# Patient Record
Sex: MALE | Race: WHITE | NOT HISPANIC OR LATINO
[De-identification: names, ages, dates, MRNs, and addresses within clinical notes are randomized per-mention and may not be internally consistent; named-entity substitution may affect disease eponyms.]

---

## 2017-01-30 ENCOUNTER — RECORDS - HEALTHEAST (OUTPATIENT)
Dept: ADMINISTRATIVE | Facility: OTHER | Age: 82
End: 2017-01-30

## 2017-02-20 ENCOUNTER — COMMUNICATION - HEALTHEAST (OUTPATIENT)
Dept: INTERNAL MEDICINE | Facility: CLINIC | Age: 82
End: 2017-02-20

## 2017-03-01 ENCOUNTER — RECORDS - HEALTHEAST (OUTPATIENT)
Dept: ADMINISTRATIVE | Facility: OTHER | Age: 82
End: 2017-03-01

## 2017-03-29 ENCOUNTER — OFFICE VISIT - HEALTHEAST (OUTPATIENT)
Dept: INTERNAL MEDICINE | Facility: CLINIC | Age: 82
End: 2017-03-29

## 2017-03-29 DIAGNOSIS — R07.89 CHEST WALL PAIN: ICD-10-CM

## 2017-03-29 ASSESSMENT — MIFFLIN-ST. JEOR: SCORE: 1421.08

## 2017-05-22 ENCOUNTER — OFFICE VISIT - HEALTHEAST (OUTPATIENT)
Dept: INTERNAL MEDICINE | Facility: CLINIC | Age: 82
End: 2017-05-22

## 2017-05-22 DIAGNOSIS — R07.89 CHEST WALL PAIN: ICD-10-CM

## 2017-05-22 ASSESSMENT — MIFFLIN-ST. JEOR: SCORE: 1416.54

## 2017-06-07 ENCOUNTER — COMMUNICATION - HEALTHEAST (OUTPATIENT)
Dept: INTERNAL MEDICINE | Facility: CLINIC | Age: 82
End: 2017-06-07

## 2017-06-08 ENCOUNTER — COMMUNICATION - HEALTHEAST (OUTPATIENT)
Dept: INTERNAL MEDICINE | Facility: CLINIC | Age: 82
End: 2017-06-08

## 2017-06-26 ENCOUNTER — RECORDS - HEALTHEAST (OUTPATIENT)
Dept: ADMINISTRATIVE | Facility: OTHER | Age: 82
End: 2017-06-26

## 2017-06-29 ENCOUNTER — OFFICE VISIT - HEALTHEAST (OUTPATIENT)
Dept: INTERNAL MEDICINE | Facility: CLINIC | Age: 82
End: 2017-06-29

## 2017-06-29 DIAGNOSIS — I25.10 CORONARY ATHEROSCLEROSIS: ICD-10-CM

## 2017-06-29 DIAGNOSIS — G45.9 TIA (TRANSIENT ISCHEMIC ATTACK): ICD-10-CM

## 2017-07-03 ENCOUNTER — OFFICE VISIT - HEALTHEAST (OUTPATIENT)
Dept: CARDIOLOGY | Facility: CLINIC | Age: 82
End: 2017-07-03

## 2017-07-03 DIAGNOSIS — I10 ESSENTIAL HYPERTENSION: ICD-10-CM

## 2017-07-03 DIAGNOSIS — I67.9 CEREBRAL VASCULAR DISEASE: ICD-10-CM

## 2017-07-03 DIAGNOSIS — I25.10 CORONARY ARTERY DISEASE INVOLVING NATIVE CORONARY ARTERY OF NATIVE HEART WITHOUT ANGINA PECTORIS: ICD-10-CM

## 2017-07-03 DIAGNOSIS — E78.5 DYSLIPIDEMIA: ICD-10-CM

## 2017-07-03 ASSESSMENT — MIFFLIN-ST. JEOR: SCORE: 1357.58

## 2017-07-06 ENCOUNTER — HOSPITAL ENCOUNTER (OUTPATIENT)
Dept: CARDIOLOGY | Facility: CLINIC | Age: 82
Discharge: HOME | End: 2017-07-06
Attending: INTERNAL MEDICINE

## 2017-07-06 DIAGNOSIS — I67.9 CEREBRAL VASCULAR DISEASE: ICD-10-CM

## 2017-07-14 ENCOUNTER — COMMUNICATION - HEALTHEAST (OUTPATIENT)
Dept: CARDIOLOGY | Facility: CLINIC | Age: 82
End: 2017-07-14

## 2017-07-19 ENCOUNTER — OFFICE VISIT - HEALTHEAST (OUTPATIENT)
Dept: INTERNAL MEDICINE | Facility: CLINIC | Age: 82
End: 2017-07-19

## 2017-07-19 DIAGNOSIS — R19.7 DIARRHEA: ICD-10-CM

## 2017-07-19 ASSESSMENT — MIFFLIN-ST. JEOR: SCORE: 1325.83

## 2017-07-20 ENCOUNTER — COMMUNICATION - HEALTHEAST (OUTPATIENT)
Dept: INTERNAL MEDICINE | Facility: CLINIC | Age: 82
End: 2017-07-20

## 2017-08-03 ENCOUNTER — COMMUNICATION - HEALTHEAST (OUTPATIENT)
Dept: INTERNAL MEDICINE | Facility: CLINIC | Age: 82
End: 2017-08-03

## 2017-08-07 ENCOUNTER — OFFICE VISIT - HEALTHEAST (OUTPATIENT)
Dept: INTERNAL MEDICINE | Facility: CLINIC | Age: 82
End: 2017-08-07

## 2017-08-07 ENCOUNTER — COMMUNICATION - HEALTHEAST (OUTPATIENT)
Dept: INTERNAL MEDICINE | Facility: CLINIC | Age: 82
End: 2017-08-07

## 2017-08-07 DIAGNOSIS — R41.3 MEMORY LOSS: ICD-10-CM

## 2017-08-07 DIAGNOSIS — M54.9 BACK PAIN: ICD-10-CM

## 2017-08-07 ASSESSMENT — MIFFLIN-ST. JEOR: SCORE: 1325.83

## 2017-08-11 ENCOUNTER — COMMUNICATION - HEALTHEAST (OUTPATIENT)
Dept: NEUROLOGY | Facility: CLINIC | Age: 82
End: 2017-08-11

## 2017-08-14 ENCOUNTER — HOSPITAL ENCOUNTER (OUTPATIENT)
Dept: NEUROLOGY | Facility: CLINIC | Age: 82
Setting detail: THERAPIES SERIES
Discharge: STILL ADMITTED OR ADMINISTRATIVE | End: 2017-08-14
Attending: SOCIAL WORKER

## 2017-08-14 ENCOUNTER — HOSPITAL ENCOUNTER (OUTPATIENT)
Dept: NEUROLOGY | Facility: CLINIC | Age: 82
Setting detail: THERAPIES SERIES
Discharge: STILL ADMITTED OR ADMINISTRATIVE | End: 2017-08-14
Attending: INTERNAL MEDICINE

## 2017-08-14 DIAGNOSIS — R41.0 CONFUSION: ICD-10-CM

## 2017-08-14 DIAGNOSIS — E78.00 PURE HYPERCHOLESTEROLEMIA: ICD-10-CM

## 2017-08-14 DIAGNOSIS — I25.10 CORONARY ATHEROSCLEROSIS: ICD-10-CM

## 2017-08-15 ENCOUNTER — HOSPITAL ENCOUNTER (OUTPATIENT)
Dept: PHYSICAL MEDICINE AND REHAB | Facility: CLINIC | Age: 82
Discharge: HOME | End: 2017-08-15
Attending: INTERNAL MEDICINE

## 2017-08-15 ENCOUNTER — COMMUNICATION - HEALTHEAST (OUTPATIENT)
Dept: NEUROLOGY | Facility: CLINIC | Age: 82
End: 2017-08-15

## 2017-08-15 DIAGNOSIS — M54.6 THORACIC BACK PAIN: ICD-10-CM

## 2017-08-15 DIAGNOSIS — M54.50 LUMBALGIA: ICD-10-CM

## 2017-08-15 DIAGNOSIS — M79.18 MYOFASCIAL PAIN: ICD-10-CM

## 2017-08-15 DIAGNOSIS — G47.9 SLEEP DISTURBANCE: ICD-10-CM

## 2017-08-15 ASSESSMENT — MIFFLIN-ST. JEOR: SCORE: 1315.85

## 2017-08-21 ENCOUNTER — HOSPITAL ENCOUNTER (OUTPATIENT)
Dept: NEUROLOGY | Facility: CLINIC | Age: 82
Discharge: HOME | End: 2017-08-21
Attending: PSYCHIATRY & NEUROLOGY

## 2017-08-21 ENCOUNTER — HOSPITAL ENCOUNTER (OUTPATIENT)
Dept: OCCUPATIONAL THERAPY | Age: 82
Setting detail: THERAPIES SERIES
Discharge: STILL ADMITTED OR ADMINISTRATIVE | End: 2017-08-21
Attending: OCCUPATIONAL THERAPIST

## 2017-08-21 ENCOUNTER — COMMUNICATION - HEALTHEAST (OUTPATIENT)
Dept: PHYSICAL MEDICINE AND REHAB | Facility: CLINIC | Age: 82
End: 2017-08-21

## 2017-08-21 DIAGNOSIS — R41.0 CONFUSION: ICD-10-CM

## 2017-08-21 DIAGNOSIS — R41.89 COGNITIVE IMPAIRMENT: ICD-10-CM

## 2017-08-29 ENCOUNTER — HOSPITAL ENCOUNTER (OUTPATIENT)
Dept: MRI IMAGING | Facility: CLINIC | Age: 82
Discharge: HOME | End: 2017-08-29
Attending: PHYSICIAN ASSISTANT

## 2017-08-29 ENCOUNTER — HOSPITAL ENCOUNTER (OUTPATIENT)
Dept: MRI IMAGING | Facility: CLINIC | Age: 82
Discharge: HOME | End: 2017-08-29
Attending: PSYCHIATRY & NEUROLOGY

## 2017-08-29 DIAGNOSIS — M54.50 LUMBALGIA: ICD-10-CM

## 2017-08-29 DIAGNOSIS — R41.0 CONFUSION: ICD-10-CM

## 2017-08-29 DIAGNOSIS — M54.6 THORACIC BACK PAIN: ICD-10-CM

## 2017-08-29 DIAGNOSIS — M79.18 MYOFASCIAL PAIN: ICD-10-CM

## 2017-08-31 ENCOUNTER — COMMUNICATION - HEALTHEAST (OUTPATIENT)
Dept: PHYSICAL MEDICINE AND REHAB | Facility: CLINIC | Age: 82
End: 2017-08-31

## 2017-09-01 ENCOUNTER — COMMUNICATION - HEALTHEAST (OUTPATIENT)
Dept: NEUROLOGY | Facility: CLINIC | Age: 82
End: 2017-09-01

## 2017-09-01 ENCOUNTER — COMMUNICATION - HEALTHEAST (OUTPATIENT)
Dept: INTERNAL MEDICINE | Facility: CLINIC | Age: 82
End: 2017-09-01

## 2017-09-06 ENCOUNTER — COMMUNICATION - HEALTHEAST (OUTPATIENT)
Dept: INTERNAL MEDICINE | Facility: CLINIC | Age: 82
End: 2017-09-06

## 2017-09-08 ENCOUNTER — RECORDS - HEALTHEAST (OUTPATIENT)
Dept: ADMINISTRATIVE | Facility: OTHER | Age: 82
End: 2017-09-08

## 2017-09-12 ENCOUNTER — COMMUNICATION - HEALTHEAST (OUTPATIENT)
Dept: PHYSICAL MEDICINE AND REHAB | Facility: CLINIC | Age: 82
End: 2017-09-12

## 2017-09-14 ENCOUNTER — COMMUNICATION - HEALTHEAST (OUTPATIENT)
Dept: INTERNAL MEDICINE | Facility: CLINIC | Age: 82
End: 2017-09-14

## 2017-09-19 ENCOUNTER — RECORDS - HEALTHEAST (OUTPATIENT)
Dept: ADMINISTRATIVE | Facility: OTHER | Age: 82
End: 2017-09-19

## 2017-09-22 ENCOUNTER — HOSPITAL ENCOUNTER (OUTPATIENT)
Dept: NEUROLOGY | Facility: CLINIC | Age: 82
Setting detail: THERAPIES SERIES
Discharge: STILL ADMITTED OR ADMINISTRATIVE | End: 2017-09-22
Attending: PSYCHIATRY & NEUROLOGY

## 2017-09-22 ENCOUNTER — COMMUNICATION - HEALTHEAST (OUTPATIENT)
Dept: INTERNAL MEDICINE | Facility: CLINIC | Age: 82
End: 2017-09-22

## 2017-09-22 ENCOUNTER — HOSPITAL ENCOUNTER (OUTPATIENT)
Dept: NEUROLOGY | Facility: CLINIC | Age: 82
Setting detail: THERAPIES SERIES
Discharge: STILL ADMITTED OR ADMINISTRATIVE | End: 2017-09-22
Attending: SOCIAL WORKER

## 2017-09-22 DIAGNOSIS — G45.9 TIA (TRANSIENT ISCHEMIC ATTACK): ICD-10-CM

## 2017-09-22 DIAGNOSIS — F09 COGNITIVE DISORDER: ICD-10-CM

## 2017-09-26 ENCOUNTER — COMMUNICATION - HEALTHEAST (OUTPATIENT)
Dept: PHYSICAL MEDICINE AND REHAB | Facility: CLINIC | Age: 82
End: 2017-09-26

## 2017-09-26 ENCOUNTER — HOSPITAL ENCOUNTER (OUTPATIENT)
Dept: RADIOLOGY | Facility: HOSPITAL | Age: 82
Discharge: HOME | End: 2017-09-26
Attending: PHYSICIAN ASSISTANT

## 2017-09-26 DIAGNOSIS — M54.50 LOW BACK PAIN: ICD-10-CM

## 2017-09-27 ENCOUNTER — AMBULATORY - HEALTHEAST (OUTPATIENT)
Dept: LAB | Facility: HOSPITAL | Age: 82
End: 2017-09-27

## 2017-09-27 ENCOUNTER — RECORDS - HEALTHEAST (OUTPATIENT)
Dept: ADMINISTRATIVE | Facility: OTHER | Age: 82
End: 2017-09-27

## 2017-09-27 DIAGNOSIS — G45.9 TRANSIENT ISCHEMIC ATTACK (TIA): ICD-10-CM

## 2017-09-28 ENCOUNTER — COMMUNICATION - HEALTHEAST (OUTPATIENT)
Dept: NEUROLOGY | Facility: CLINIC | Age: 82
End: 2017-09-28

## 2017-09-28 ENCOUNTER — HOSPITAL ENCOUNTER (OUTPATIENT)
Dept: NEUROLOGY | Facility: CLINIC | Age: 82
Setting detail: THERAPIES SERIES
Discharge: STILL ADMITTED OR ADMINISTRATIVE | End: 2017-09-28
Attending: PSYCHIATRY & NEUROLOGY

## 2017-09-28 ENCOUNTER — HOSPITAL ENCOUNTER (OUTPATIENT)
Dept: PHYSICAL MEDICINE AND REHAB | Facility: CLINIC | Age: 82
Discharge: HOME | End: 2017-09-28
Attending: PHYSICIAN ASSISTANT

## 2017-09-28 ENCOUNTER — AMBULATORY - HEALTHEAST (OUTPATIENT)
Dept: LAB | Facility: HOSPITAL | Age: 82
End: 2017-09-28

## 2017-09-28 DIAGNOSIS — G47.9 SLEEP DISTURBANCE: ICD-10-CM

## 2017-09-28 DIAGNOSIS — M81.0 OSTEOPOROSIS: ICD-10-CM

## 2017-09-28 DIAGNOSIS — M54.6 THORACIC BACK PAIN: ICD-10-CM

## 2017-09-28 DIAGNOSIS — M54.50 LUMBALGIA: ICD-10-CM

## 2017-09-28 DIAGNOSIS — M79.18 MYOFASCIAL PAIN: ICD-10-CM

## 2017-09-28 DIAGNOSIS — M54.50 LOW BACK PAIN: ICD-10-CM

## 2017-10-09 ENCOUNTER — RECORDS - HEALTHEAST (OUTPATIENT)
Dept: ADMINISTRATIVE | Facility: OTHER | Age: 82
End: 2017-10-09

## 2017-10-11 ENCOUNTER — RECORDS - HEALTHEAST (OUTPATIENT)
Dept: ADMINISTRATIVE | Facility: OTHER | Age: 82
End: 2017-10-11

## 2017-10-11 ENCOUNTER — RECORDS - HEALTHEAST (OUTPATIENT)
Dept: BONE DENSITY | Facility: CLINIC | Age: 82
End: 2017-10-11

## 2017-10-11 DIAGNOSIS — M54.6 PAIN IN THORACIC SPINE: ICD-10-CM

## 2017-10-11 DIAGNOSIS — M81.0 AGE-RELATED OSTEOPOROSIS WITHOUT CURRENT PATHOLOGICAL FRACTURE: ICD-10-CM

## 2017-10-11 DIAGNOSIS — M54.50 LOW BACK PAIN: ICD-10-CM

## 2017-10-13 ENCOUNTER — COMMUNICATION - HEALTHEAST (OUTPATIENT)
Dept: INTERNAL MEDICINE | Facility: CLINIC | Age: 82
End: 2017-10-13

## 2017-10-13 ENCOUNTER — RECORDS - HEALTHEAST (OUTPATIENT)
Dept: ADMINISTRATIVE | Facility: OTHER | Age: 82
End: 2017-10-13

## 2017-10-16 ENCOUNTER — RECORDS - HEALTHEAST (OUTPATIENT)
Dept: ADMINISTRATIVE | Facility: OTHER | Age: 82
End: 2017-10-16

## 2017-10-19 ENCOUNTER — RECORDS - HEALTHEAST (OUTPATIENT)
Dept: ADMINISTRATIVE | Facility: OTHER | Age: 82
End: 2017-10-19

## 2017-10-23 ENCOUNTER — RECORDS - HEALTHEAST (OUTPATIENT)
Dept: ADMINISTRATIVE | Facility: OTHER | Age: 82
End: 2017-10-23

## 2017-10-23 ENCOUNTER — COMMUNICATION - HEALTHEAST (OUTPATIENT)
Dept: INTERNAL MEDICINE | Facility: CLINIC | Age: 82
End: 2017-10-23

## 2017-10-26 ENCOUNTER — COMMUNICATION - HEALTHEAST (OUTPATIENT)
Dept: INTERNAL MEDICINE | Facility: CLINIC | Age: 82
End: 2017-10-26

## 2017-10-27 ENCOUNTER — RECORDS - HEALTHEAST (OUTPATIENT)
Dept: ADMINISTRATIVE | Facility: OTHER | Age: 82
End: 2017-10-27

## 2017-11-02 ENCOUNTER — COMMUNICATION - HEALTHEAST (OUTPATIENT)
Dept: INTERNAL MEDICINE | Facility: CLINIC | Age: 82
End: 2017-11-02

## 2017-11-02 ENCOUNTER — RECORDS - HEALTHEAST (OUTPATIENT)
Dept: ADMINISTRATIVE | Facility: OTHER | Age: 82
End: 2017-11-02

## 2017-11-03 ENCOUNTER — COMMUNICATION - HEALTHEAST (OUTPATIENT)
Dept: INTERNAL MEDICINE | Facility: CLINIC | Age: 82
End: 2017-11-03

## 2017-11-06 ENCOUNTER — AMBULATORY - HEALTHEAST (OUTPATIENT)
Dept: INTERNAL MEDICINE | Facility: CLINIC | Age: 82
End: 2017-11-06

## 2017-11-06 ENCOUNTER — RECORDS - HEALTHEAST (OUTPATIENT)
Dept: ADMINISTRATIVE | Facility: OTHER | Age: 82
End: 2017-11-06

## 2017-11-08 ENCOUNTER — RECORDS - HEALTHEAST (OUTPATIENT)
Dept: ADMINISTRATIVE | Facility: OTHER | Age: 82
End: 2017-11-08

## 2017-11-10 ENCOUNTER — COMMUNICATION - HEALTHEAST (OUTPATIENT)
Dept: SCHEDULING | Facility: CLINIC | Age: 82
End: 2017-11-10

## 2017-11-15 ENCOUNTER — RECORDS - HEALTHEAST (OUTPATIENT)
Dept: ADMINISTRATIVE | Facility: OTHER | Age: 82
End: 2017-11-15

## 2017-11-27 ENCOUNTER — RECORDS - HEALTHEAST (OUTPATIENT)
Dept: ADMINISTRATIVE | Facility: OTHER | Age: 82
End: 2017-11-27

## 2017-12-01 ENCOUNTER — COMMUNICATION - HEALTHEAST (OUTPATIENT)
Dept: INTERNAL MEDICINE | Facility: CLINIC | Age: 82
End: 2017-12-01

## 2017-12-04 ENCOUNTER — RECORDS - HEALTHEAST (OUTPATIENT)
Dept: ADMINISTRATIVE | Facility: OTHER | Age: 82
End: 2017-12-04

## 2017-12-06 ENCOUNTER — COMMUNICATION - HEALTHEAST (OUTPATIENT)
Dept: NEUROLOGY | Facility: CLINIC | Age: 82
End: 2017-12-06

## 2017-12-11 ENCOUNTER — COMMUNICATION - HEALTHEAST (OUTPATIENT)
Dept: NEUROLOGY | Facility: CLINIC | Age: 82
End: 2017-12-11

## 2017-12-11 ENCOUNTER — COMMUNICATION - HEALTHEAST (OUTPATIENT)
Dept: INTERNAL MEDICINE | Facility: CLINIC | Age: 82
End: 2017-12-11

## 2017-12-13 ENCOUNTER — AMBULATORY - HEALTHEAST (OUTPATIENT)
Dept: NURSING | Facility: CLINIC | Age: 82
End: 2017-12-13

## 2017-12-13 ENCOUNTER — AMBULATORY - HEALTHEAST (OUTPATIENT)
Dept: INTERNAL MEDICINE | Facility: CLINIC | Age: 82
End: 2017-12-13

## 2017-12-18 ENCOUNTER — RECORDS - HEALTHEAST (OUTPATIENT)
Dept: ADMINISTRATIVE | Facility: OTHER | Age: 82
End: 2017-12-18

## 2018-01-04 ENCOUNTER — HOSPITAL ENCOUNTER (OUTPATIENT)
Dept: NEUROLOGY | Facility: CLINIC | Age: 83
Setting detail: THERAPIES SERIES
Discharge: STILL ADMITTED OR ADMINISTRATIVE | End: 2018-01-04
Attending: PSYCHIATRY & NEUROLOGY

## 2018-01-04 DIAGNOSIS — F01.50 VASCULAR DEMENTIA WITHOUT BEHAVIORAL DISTURBANCE (H): ICD-10-CM

## 2018-01-18 ENCOUNTER — RECORDS - HEALTHEAST (OUTPATIENT)
Dept: ADMINISTRATIVE | Facility: OTHER | Age: 83
End: 2018-01-18

## 2018-01-20 ENCOUNTER — COMMUNICATION - HEALTHEAST (OUTPATIENT)
Dept: NEUROLOGY | Facility: CLINIC | Age: 83
End: 2018-01-20

## 2018-01-24 ENCOUNTER — RECORDS - HEALTHEAST (OUTPATIENT)
Dept: ADMINISTRATIVE | Facility: OTHER | Age: 83
End: 2018-01-24

## 2018-01-26 ENCOUNTER — RECORDS - HEALTHEAST (OUTPATIENT)
Dept: ADMINISTRATIVE | Facility: OTHER | Age: 83
End: 2018-01-26

## 2018-02-12 ENCOUNTER — COMMUNICATION - HEALTHEAST (OUTPATIENT)
Dept: INTERNAL MEDICINE | Facility: CLINIC | Age: 83
End: 2018-02-12

## 2018-02-28 ENCOUNTER — RECORDS - HEALTHEAST (OUTPATIENT)
Dept: ADMINISTRATIVE | Facility: OTHER | Age: 83
End: 2018-02-28

## 2018-03-08 ENCOUNTER — RECORDS - HEALTHEAST (OUTPATIENT)
Dept: ADMINISTRATIVE | Facility: OTHER | Age: 83
End: 2018-03-08

## 2018-03-14 ENCOUNTER — RECORDS - HEALTHEAST (OUTPATIENT)
Dept: ADMINISTRATIVE | Facility: OTHER | Age: 83
End: 2018-03-14

## 2018-03-27 ENCOUNTER — OFFICE VISIT - HEALTHEAST (OUTPATIENT)
Dept: INTERNAL MEDICINE | Facility: CLINIC | Age: 83
End: 2018-03-27

## 2018-03-27 ENCOUNTER — COMMUNICATION - HEALTHEAST (OUTPATIENT)
Dept: INTERNAL MEDICINE | Facility: CLINIC | Age: 83
End: 2018-03-27

## 2018-03-27 DIAGNOSIS — M84.48XA: ICD-10-CM

## 2018-03-27 DIAGNOSIS — M81.0 OSTEOPOROSIS: ICD-10-CM

## 2018-03-28 ENCOUNTER — RECORDS - HEALTHEAST (OUTPATIENT)
Dept: ADMINISTRATIVE | Facility: OTHER | Age: 83
End: 2018-03-28

## 2018-04-09 ENCOUNTER — COMMUNICATION - HEALTHEAST (OUTPATIENT)
Dept: INTERNAL MEDICINE | Facility: CLINIC | Age: 83
End: 2018-04-09

## 2018-04-10 ENCOUNTER — COMMUNICATION - HEALTHEAST (OUTPATIENT)
Dept: INTERNAL MEDICINE | Facility: CLINIC | Age: 83
End: 2018-04-10

## 2018-04-12 ENCOUNTER — RECORDS - HEALTHEAST (OUTPATIENT)
Dept: ADMINISTRATIVE | Facility: OTHER | Age: 83
End: 2018-04-12

## 2018-04-17 ENCOUNTER — AMBULATORY - HEALTHEAST (OUTPATIENT)
Dept: INTERNAL MEDICINE | Facility: CLINIC | Age: 83
End: 2018-04-17

## 2018-04-17 DIAGNOSIS — M81.0 OSTEOPOROSIS: ICD-10-CM

## 2018-04-23 ENCOUNTER — COMMUNICATION - HEALTHEAST (OUTPATIENT)
Dept: INTERNAL MEDICINE | Facility: CLINIC | Age: 83
End: 2018-04-23

## 2018-04-25 ENCOUNTER — COMMUNICATION - HEALTHEAST (OUTPATIENT)
Dept: INTERNAL MEDICINE | Facility: CLINIC | Age: 83
End: 2018-04-25

## 2018-04-27 ENCOUNTER — RECORDS - HEALTHEAST (OUTPATIENT)
Dept: ADMINISTRATIVE | Facility: OTHER | Age: 83
End: 2018-04-27

## 2018-04-30 ENCOUNTER — RECORDS - HEALTHEAST (OUTPATIENT)
Dept: ADMINISTRATIVE | Facility: OTHER | Age: 83
End: 2018-04-30

## 2018-05-03 ENCOUNTER — RECORDS - HEALTHEAST (OUTPATIENT)
Dept: ADMINISTRATIVE | Facility: OTHER | Age: 83
End: 2018-05-03

## 2018-05-03 ENCOUNTER — COMMUNICATION - HEALTHEAST (OUTPATIENT)
Dept: INTERNAL MEDICINE | Facility: CLINIC | Age: 83
End: 2018-05-03

## 2018-05-09 ENCOUNTER — COMMUNICATION - HEALTHEAST (OUTPATIENT)
Dept: INTERNAL MEDICINE | Facility: CLINIC | Age: 83
End: 2018-05-09

## 2018-06-01 ENCOUNTER — RECORDS - HEALTHEAST (OUTPATIENT)
Dept: ADMINISTRATIVE | Facility: OTHER | Age: 83
End: 2018-06-01

## 2018-06-01 ENCOUNTER — COMMUNICATION - HEALTHEAST (OUTPATIENT)
Dept: INTERNAL MEDICINE | Facility: CLINIC | Age: 83
End: 2018-06-01

## 2018-07-19 ENCOUNTER — RECORDS - HEALTHEAST (OUTPATIENT)
Dept: ADMINISTRATIVE | Facility: OTHER | Age: 83
End: 2018-07-19

## 2018-08-01 ENCOUNTER — HOSPITAL ENCOUNTER (OUTPATIENT)
Dept: PHYSICAL MEDICINE AND REHAB | Facility: CLINIC | Age: 83
Discharge: HOME | End: 2018-08-01
Attending: PHYSICIAN ASSISTANT

## 2018-08-01 DIAGNOSIS — M81.0 OSTEOPOROSIS: ICD-10-CM

## 2018-08-01 DIAGNOSIS — G47.9 SLEEP DISTURBANCE: ICD-10-CM

## 2018-08-01 DIAGNOSIS — M54.50 LUMBALGIA: ICD-10-CM

## 2018-08-01 DIAGNOSIS — M54.6 THORACIC BACK PAIN: ICD-10-CM

## 2018-08-01 DIAGNOSIS — M79.18 MYOFASCIAL PAIN: ICD-10-CM

## 2018-08-01 ASSESSMENT — MIFFLIN-ST. JEOR: SCORE: 1441.04

## 2018-08-06 ENCOUNTER — RECORDS - HEALTHEAST (OUTPATIENT)
Dept: ADMINISTRATIVE | Facility: OTHER | Age: 83
End: 2018-08-06

## 2018-08-07 ENCOUNTER — COMMUNICATION - HEALTHEAST (OUTPATIENT)
Dept: INTERNAL MEDICINE | Facility: CLINIC | Age: 83
End: 2018-08-07

## 2018-08-07 ENCOUNTER — RECORDS - HEALTHEAST (OUTPATIENT)
Dept: ADMINISTRATIVE | Facility: OTHER | Age: 83
End: 2018-08-07

## 2018-08-09 ENCOUNTER — COMMUNICATION - HEALTHEAST (OUTPATIENT)
Dept: INTERNAL MEDICINE | Facility: CLINIC | Age: 83
End: 2018-08-09

## 2018-08-10 ENCOUNTER — COMMUNICATION - HEALTHEAST (OUTPATIENT)
Dept: SCHEDULING | Facility: CLINIC | Age: 83
End: 2018-08-10

## 2018-08-10 ENCOUNTER — OFFICE VISIT - HEALTHEAST (OUTPATIENT)
Dept: FAMILY MEDICINE | Facility: CLINIC | Age: 83
End: 2018-08-10

## 2018-08-10 DIAGNOSIS — J20.9 BRONCHITIS, ACUTE: ICD-10-CM

## 2018-08-10 DIAGNOSIS — R05.9 COUGH: ICD-10-CM

## 2018-08-15 ENCOUNTER — COMMUNICATION - HEALTHEAST (OUTPATIENT)
Dept: INTERNAL MEDICINE | Facility: CLINIC | Age: 83
End: 2018-08-15

## 2018-08-15 ENCOUNTER — OFFICE VISIT - HEALTHEAST (OUTPATIENT)
Dept: INTERNAL MEDICINE | Facility: CLINIC | Age: 83
End: 2018-08-15

## 2018-08-15 DIAGNOSIS — M54.9 BACKACHE: ICD-10-CM

## 2018-08-15 DIAGNOSIS — M81.0 OSTEOPOROSIS: ICD-10-CM

## 2018-08-15 DIAGNOSIS — F02.80 DEMENTIA ASSOCIATED WITH OTHER UNDERLYING DISEASE WITHOUT BEHAVIORAL DISTURBANCE (H): ICD-10-CM

## 2018-08-15 ASSESSMENT — MIFFLIN-ST. JEOR: SCORE: 1455.1

## 2018-08-16 ENCOUNTER — COMMUNICATION - HEALTHEAST (OUTPATIENT)
Dept: INTERNAL MEDICINE | Facility: CLINIC | Age: 83
End: 2018-08-16

## 2018-08-16 ENCOUNTER — RECORDS - HEALTHEAST (OUTPATIENT)
Dept: ADMINISTRATIVE | Facility: OTHER | Age: 83
End: 2018-08-16

## 2018-08-20 ENCOUNTER — COMMUNICATION - HEALTHEAST (OUTPATIENT)
Dept: INTERNAL MEDICINE | Facility: CLINIC | Age: 83
End: 2018-08-20

## 2018-08-22 ENCOUNTER — RECORDS - HEALTHEAST (OUTPATIENT)
Dept: ADMINISTRATIVE | Facility: OTHER | Age: 83
End: 2018-08-22

## 2018-09-17 ENCOUNTER — COMMUNICATION - HEALTHEAST (OUTPATIENT)
Dept: INTERNAL MEDICINE | Facility: CLINIC | Age: 83
End: 2018-09-17

## 2018-09-19 ENCOUNTER — COMMUNICATION - HEALTHEAST (OUTPATIENT)
Dept: ADMINISTRATIVE | Facility: CLINIC | Age: 83
End: 2018-09-19

## 2018-09-21 ENCOUNTER — COMMUNICATION - HEALTHEAST (OUTPATIENT)
Dept: SCHEDULING | Facility: CLINIC | Age: 83
End: 2018-09-21

## 2018-09-25 ENCOUNTER — RECORDS - HEALTHEAST (OUTPATIENT)
Dept: ADMINISTRATIVE | Facility: OTHER | Age: 83
End: 2018-09-25

## 2018-09-26 ENCOUNTER — RECORDS - HEALTHEAST (OUTPATIENT)
Dept: ADMINISTRATIVE | Facility: OTHER | Age: 83
End: 2018-09-26

## 2018-09-27 ENCOUNTER — RECORDS - HEALTHEAST (OUTPATIENT)
Dept: ADMINISTRATIVE | Facility: OTHER | Age: 83
End: 2018-09-27

## 2018-10-01 ENCOUNTER — AMBULATORY - HEALTHEAST (OUTPATIENT)
Dept: NURSING | Facility: CLINIC | Age: 83
End: 2018-10-01

## 2018-10-17 ENCOUNTER — COMMUNICATION - HEALTHEAST (OUTPATIENT)
Dept: INTERNAL MEDICINE | Facility: CLINIC | Age: 83
End: 2018-10-17

## 2018-10-24 ENCOUNTER — COMMUNICATION - HEALTHEAST (OUTPATIENT)
Dept: INTERNAL MEDICINE | Facility: CLINIC | Age: 83
End: 2018-10-24

## 2018-11-12 ENCOUNTER — OFFICE VISIT - HEALTHEAST (OUTPATIENT)
Dept: INTERNAL MEDICINE | Facility: CLINIC | Age: 83
End: 2018-11-12

## 2018-11-12 DIAGNOSIS — F03.90 DEMENTIA WITHOUT BEHAVIORAL DISTURBANCE, UNSPECIFIED DEMENTIA TYPE: ICD-10-CM

## 2018-11-12 DIAGNOSIS — G47.30 SLEEP APNEA, UNSPECIFIED TYPE: ICD-10-CM

## 2018-11-12 ASSESSMENT — MIFFLIN-ST. JEOR: SCORE: 1455.1

## 2018-11-14 ENCOUNTER — COMMUNICATION - HEALTHEAST (OUTPATIENT)
Dept: INTERNAL MEDICINE | Facility: CLINIC | Age: 83
End: 2018-11-14

## 2018-11-19 ENCOUNTER — COMMUNICATION - HEALTHEAST (OUTPATIENT)
Dept: NEUROLOGY | Facility: CLINIC | Age: 83
End: 2018-11-19

## 2018-11-19 DIAGNOSIS — F01.50 VASCULAR DEMENTIA (H): ICD-10-CM

## 2018-12-21 ENCOUNTER — COMMUNICATION - HEALTHEAST (OUTPATIENT)
Dept: NEUROLOGY | Facility: CLINIC | Age: 83
End: 2018-12-21

## 2018-12-21 ENCOUNTER — RECORDS - HEALTHEAST (OUTPATIENT)
Dept: ADMINISTRATIVE | Facility: OTHER | Age: 83
End: 2018-12-21

## 2018-12-26 ENCOUNTER — RECORDS - HEALTHEAST (OUTPATIENT)
Dept: ADMINISTRATIVE | Facility: OTHER | Age: 83
End: 2018-12-26

## 2018-12-26 ENCOUNTER — COMMUNICATION - HEALTHEAST (OUTPATIENT)
Dept: INTERNAL MEDICINE | Facility: CLINIC | Age: 83
End: 2018-12-26

## 2018-12-26 DIAGNOSIS — R05.9 COUGH: ICD-10-CM

## 2019-02-18 ENCOUNTER — RECORDS - HEALTHEAST (OUTPATIENT)
Dept: ADMINISTRATIVE | Facility: OTHER | Age: 84
End: 2019-02-18

## 2019-03-03 ENCOUNTER — RECORDS - HEALTHEAST (OUTPATIENT)
Dept: GENERAL RADIOLOGY | Facility: CLINIC | Age: 84
End: 2019-03-03

## 2019-03-03 ENCOUNTER — OFFICE VISIT - HEALTHEAST (OUTPATIENT)
Dept: FAMILY MEDICINE | Facility: CLINIC | Age: 84
End: 2019-03-03

## 2019-03-03 DIAGNOSIS — M54.6 MIDLINE THORACIC BACK PAIN, UNSPECIFIED CHRONICITY: ICD-10-CM

## 2019-03-03 DIAGNOSIS — M54.6 PAIN IN THORACIC SPINE: ICD-10-CM

## 2019-03-05 ENCOUNTER — COMMUNICATION - HEALTHEAST (OUTPATIENT)
Dept: INTERNAL MEDICINE | Facility: CLINIC | Age: 84
End: 2019-03-05

## 2019-03-13 ENCOUNTER — OFFICE VISIT - HEALTHEAST (OUTPATIENT)
Dept: INTERNAL MEDICINE | Facility: CLINIC | Age: 84
End: 2019-03-13

## 2019-03-13 DIAGNOSIS — M54.50 CHRONIC LOW BACK PAIN WITHOUT SCIATICA, UNSPECIFIED BACK PAIN LATERALITY: ICD-10-CM

## 2019-03-13 DIAGNOSIS — G89.29 CHRONIC LOW BACK PAIN WITHOUT SCIATICA, UNSPECIFIED BACK PAIN LATERALITY: ICD-10-CM

## 2019-03-13 DIAGNOSIS — R29.6 RECURRENT FALLS: ICD-10-CM

## 2019-03-13 DIAGNOSIS — I10 ESSENTIAL HYPERTENSION: ICD-10-CM

## 2019-03-13 ASSESSMENT — MIFFLIN-ST. JEOR: SCORE: 1436.96

## 2019-03-22 ENCOUNTER — RECORDS - HEALTHEAST (OUTPATIENT)
Dept: ADMINISTRATIVE | Facility: OTHER | Age: 84
End: 2019-03-22

## 2019-04-16 ENCOUNTER — COMMUNICATION - HEALTHEAST (OUTPATIENT)
Dept: INTERNAL MEDICINE | Facility: CLINIC | Age: 84
End: 2019-04-16

## 2019-04-16 DIAGNOSIS — F01.50 VASCULAR DEMENTIA (H): ICD-10-CM

## 2019-04-23 ENCOUNTER — RECORDS - HEALTHEAST (OUTPATIENT)
Dept: ADMINISTRATIVE | Facility: OTHER | Age: 84
End: 2019-04-23

## 2019-05-02 ENCOUNTER — OFFICE VISIT - HEALTHEAST (OUTPATIENT)
Dept: INTERNAL MEDICINE | Facility: CLINIC | Age: 84
End: 2019-05-02

## 2019-05-02 DIAGNOSIS — M81.0 AGE-RELATED OSTEOPOROSIS WITHOUT CURRENT PATHOLOGICAL FRACTURE: ICD-10-CM

## 2019-05-02 LAB
ANION GAP SERPL CALCULATED.3IONS-SCNC: 10 MMOL/L (ref 5–18)
BUN SERPL-MCNC: 22 MG/DL (ref 8–28)
CALCIUM SERPL-MCNC: 10.2 MG/DL (ref 8.5–10.5)
CHLORIDE BLD-SCNC: 103 MMOL/L (ref 98–107)
CO2 SERPL-SCNC: 26 MMOL/L (ref 22–31)
CREAT SERPL-MCNC: 1.22 MG/DL (ref 0.7–1.3)
GFR SERPL CREATININE-BSD FRML MDRD: 57 ML/MIN/1.73M2
GLUCOSE BLD-MCNC: 89 MG/DL (ref 70–125)
POTASSIUM BLD-SCNC: 4.9 MMOL/L (ref 3.5–5)
SODIUM SERPL-SCNC: 139 MMOL/L (ref 136–145)

## 2019-05-03 LAB
25(OH)D3 SERPL-MCNC: 54.9 NG/ML (ref 30–80)
25(OH)D3 SERPL-MCNC: 54.9 NG/ML (ref 30–80)

## 2019-05-17 ENCOUNTER — COMMUNICATION - HEALTHEAST (OUTPATIENT)
Dept: INTERNAL MEDICINE | Facility: CLINIC | Age: 84
End: 2019-05-17

## 2019-05-17 DIAGNOSIS — F01.50 VASCULAR DEMENTIA (H): ICD-10-CM

## 2019-05-20 ENCOUNTER — COMMUNICATION - HEALTHEAST (OUTPATIENT)
Dept: INTERNAL MEDICINE | Facility: CLINIC | Age: 84
End: 2019-05-20

## 2019-05-20 RX ORDER — SAW/PYGEUM/BETA/HERB/D3/B6/ZN 30 MG-25MG
1 CAPSULE ORAL AT BEDTIME
Qty: 90 TABLET | Refills: 3 | Status: SHIPPED | OUTPATIENT
Start: 2019-05-20

## 2019-05-31 ENCOUNTER — RECORDS - HEALTHEAST (OUTPATIENT)
Dept: ADMINISTRATIVE | Facility: OTHER | Age: 84
End: 2019-05-31

## 2019-07-10 ENCOUNTER — RECORDS - HEALTHEAST (OUTPATIENT)
Dept: LAB | Facility: CLINIC | Age: 84
End: 2019-07-10

## 2019-07-10 ENCOUNTER — RECORDS - HEALTHEAST (OUTPATIENT)
Dept: ADMINISTRATIVE | Facility: OTHER | Age: 84
End: 2019-07-10

## 2019-07-10 LAB
ALBUMIN UR-MCNC: ABNORMAL MG/DL
APPEARANCE UR: CLEAR
BACTERIA #/AREA URNS HPF: ABNORMAL HPF
BILIRUB UR QL STRIP: NEGATIVE
COLOR UR AUTO: YELLOW
GLUCOSE UR STRIP-MCNC: NEGATIVE MG/DL
HGB UR QL STRIP: NEGATIVE
KETONES UR STRIP-MCNC: ABNORMAL MG/DL
LEUKOCYTE ESTERASE UR QL STRIP: NEGATIVE
NITRATE UR QL: NEGATIVE
PH UR STRIP: 6.5 [PH] (ref 4.5–8)
RBC #/AREA URNS AUTO: ABNORMAL HPF
SP GR UR STRIP: 1.02 (ref 1–1.03)
SQUAMOUS #/AREA URNS AUTO: ABNORMAL LPF
UROBILINOGEN UR STRIP-ACNC: ABNORMAL
WBC #/AREA URNS AUTO: ABNORMAL HPF

## 2019-07-11 LAB — BACTERIA SPEC CULT: NO GROWTH

## 2019-07-12 ENCOUNTER — COMMUNICATION - HEALTHEAST (OUTPATIENT)
Dept: INTERNAL MEDICINE | Facility: CLINIC | Age: 84
End: 2019-07-12

## 2019-07-12 ENCOUNTER — DOCUMENTATION ONLY (OUTPATIENT)
Dept: OTHER | Facility: CLINIC | Age: 84
End: 2019-07-12

## 2019-07-12 ENCOUNTER — AMBULATORY - HEALTHEAST (OUTPATIENT)
Dept: OTHER | Facility: CLINIC | Age: 84
End: 2019-07-12

## 2019-07-15 ENCOUNTER — COMMUNICATION - HEALTHEAST (OUTPATIENT)
Dept: INTERNAL MEDICINE | Facility: CLINIC | Age: 84
End: 2019-07-15

## 2019-07-18 ENCOUNTER — COMMUNICATION - HEALTHEAST (OUTPATIENT)
Dept: INTERNAL MEDICINE | Facility: CLINIC | Age: 84
End: 2019-07-18

## 2019-07-18 DIAGNOSIS — S22.000A COMPRESSION FRACTURE OF BODY OF THORACIC VERTEBRA (H): ICD-10-CM

## 2019-07-20 ENCOUNTER — COMMUNICATION - HEALTHEAST (OUTPATIENT)
Dept: SCHEDULING | Facility: CLINIC | Age: 84
End: 2019-07-20

## 2019-07-20 ENCOUNTER — COMMUNICATION - HEALTHEAST (OUTPATIENT)
Dept: INTERNAL MEDICINE | Facility: CLINIC | Age: 84
End: 2019-07-20

## 2019-07-20 DIAGNOSIS — S22.000A COMPRESSION FRACTURE OF BODY OF THORACIC VERTEBRA (H): ICD-10-CM

## 2019-07-22 ENCOUNTER — COMMUNICATION - HEALTHEAST (OUTPATIENT)
Dept: INTERNAL MEDICINE | Facility: CLINIC | Age: 84
End: 2019-07-22

## 2019-07-24 ENCOUNTER — COMMUNICATION - HEALTHEAST (OUTPATIENT)
Dept: INTERNAL MEDICINE | Facility: CLINIC | Age: 84
End: 2019-07-24

## 2019-07-24 ENCOUNTER — OFFICE VISIT - HEALTHEAST (OUTPATIENT)
Dept: INTERNAL MEDICINE | Facility: CLINIC | Age: 84
End: 2019-07-24

## 2019-07-24 DIAGNOSIS — S22.000A COMPRESSION FRACTURE OF BODY OF THORACIC VERTEBRA (H): ICD-10-CM

## 2019-07-24 DIAGNOSIS — F02.80 DEMENTIA ASSOCIATED WITH OTHER UNDERLYING DISEASE WITHOUT BEHAVIORAL DISTURBANCE (H): ICD-10-CM

## 2019-07-26 ENCOUNTER — COMMUNICATION - HEALTHEAST (OUTPATIENT)
Dept: INTERNAL MEDICINE | Facility: CLINIC | Age: 84
End: 2019-07-26

## 2019-07-26 DIAGNOSIS — S22.000A COMPRESSION FRACTURE OF BODY OF THORACIC VERTEBRA (H): ICD-10-CM

## 2019-07-30 ENCOUNTER — HOSPITAL ENCOUNTER (OUTPATIENT)
Dept: PHYSICAL MEDICINE AND REHAB | Facility: CLINIC | Age: 84
Discharge: HOME | End: 2019-07-30
Attending: NURSE PRACTITIONER

## 2019-07-30 DIAGNOSIS — S32.020G CLOSED COMPRESSION FRACTURE OF L2 LUMBAR VERTEBRA WITH DELAYED HEALING, SUBSEQUENT ENCOUNTER: ICD-10-CM

## 2019-07-30 DIAGNOSIS — M54.16 RIGHT LUMBAR RADICULITIS: ICD-10-CM

## 2019-07-30 DIAGNOSIS — M54.41 CHRONIC BILATERAL LOW BACK PAIN WITH RIGHT-SIDED SCIATICA: ICD-10-CM

## 2019-07-30 DIAGNOSIS — G89.29 CHRONIC BILATERAL LOW BACK PAIN WITH RIGHT-SIDED SCIATICA: ICD-10-CM

## 2019-07-30 DIAGNOSIS — Z87.81 HISTORY OF COMPRESSION FRACTURE OF SPINE: ICD-10-CM

## 2019-07-30 DIAGNOSIS — Z98.890 HISTORY OF LUMBAR SURGERY: ICD-10-CM

## 2019-08-04 ENCOUNTER — COMMUNICATION - HEALTHEAST (OUTPATIENT)
Dept: SCHEDULING | Facility: CLINIC | Age: 84
End: 2019-08-04

## 2019-08-04 DIAGNOSIS — S22.000A COMPRESSION FRACTURE OF BODY OF THORACIC VERTEBRA (H): ICD-10-CM

## 2019-08-05 ENCOUNTER — RECORDS - HEALTHEAST (OUTPATIENT)
Dept: ADMINISTRATIVE | Facility: OTHER | Age: 84
End: 2019-08-05

## 2019-08-07 ENCOUNTER — COMMUNICATION - HEALTHEAST (OUTPATIENT)
Dept: INTERNAL MEDICINE | Facility: CLINIC | Age: 84
End: 2019-08-07

## 2019-08-07 ENCOUNTER — COMMUNICATION - HEALTHEAST (OUTPATIENT)
Dept: SCHEDULING | Facility: CLINIC | Age: 84
End: 2019-08-07

## 2019-08-07 DIAGNOSIS — S22.000A COMPRESSION FRACTURE OF BODY OF THORACIC VERTEBRA (H): ICD-10-CM

## 2019-08-08 ENCOUNTER — COMMUNICATION - HEALTHEAST (OUTPATIENT)
Dept: SCHEDULING | Facility: CLINIC | Age: 84
End: 2019-08-08

## 2019-08-11 ENCOUNTER — RECORDS - HEALTHEAST (OUTPATIENT)
Dept: ADMINISTRATIVE | Facility: OTHER | Age: 84
End: 2019-08-11

## 2019-08-12 ENCOUNTER — COMMUNICATION - HEALTHEAST (OUTPATIENT)
Dept: INTERNAL MEDICINE | Facility: CLINIC | Age: 84
End: 2019-08-12

## 2019-08-13 ENCOUNTER — COMMUNICATION - HEALTHEAST (OUTPATIENT)
Dept: INTERNAL MEDICINE | Facility: CLINIC | Age: 84
End: 2019-08-13

## 2019-08-13 DIAGNOSIS — G89.29 CHRONIC LOW BACK PAIN WITHOUT SCIATICA, UNSPECIFIED BACK PAIN LATERALITY: ICD-10-CM

## 2019-08-13 DIAGNOSIS — M54.50 CHRONIC LOW BACK PAIN WITHOUT SCIATICA, UNSPECIFIED BACK PAIN LATERALITY: ICD-10-CM

## 2019-08-25 ENCOUNTER — RECORDS - HEALTHEAST (OUTPATIENT)
Dept: ADMINISTRATIVE | Facility: OTHER | Age: 84
End: 2019-08-25

## 2019-08-26 ENCOUNTER — COMMUNICATION - HEALTHEAST (OUTPATIENT)
Dept: INTERNAL MEDICINE | Facility: CLINIC | Age: 84
End: 2019-08-26

## 2019-08-26 ENCOUNTER — RECORDS - HEALTHEAST (OUTPATIENT)
Dept: ADMINISTRATIVE | Facility: OTHER | Age: 84
End: 2019-08-26

## 2019-08-26 ENCOUNTER — AMBULATORY - HEALTHEAST (OUTPATIENT)
Dept: INTERNAL MEDICINE | Facility: CLINIC | Age: 84
End: 2019-08-26

## 2019-08-26 DIAGNOSIS — S22.000A COMPRESSION FRACTURE OF BODY OF THORACIC VERTEBRA (H): ICD-10-CM

## 2019-08-26 DIAGNOSIS — Z79.899 MEDICATION MANAGEMENT: ICD-10-CM

## 2019-08-27 ENCOUNTER — RECORDS - HEALTHEAST (OUTPATIENT)
Dept: ADMINISTRATIVE | Facility: OTHER | Age: 84
End: 2019-08-27

## 2019-08-27 ENCOUNTER — COMMUNICATION - HEALTHEAST (OUTPATIENT)
Dept: INTERNAL MEDICINE | Facility: CLINIC | Age: 84
End: 2019-08-27

## 2019-08-27 DIAGNOSIS — S22.000A COMPRESSION FRACTURE OF BODY OF THORACIC VERTEBRA (H): ICD-10-CM

## 2019-08-27 RX ORDER — OXYCODONE HYDROCHLORIDE 5 MG/1
TABLET ORAL
Refills: 0 | OUTPATIENT
Start: 2019-08-27

## 2019-08-29 ENCOUNTER — COMMUNICATION - HEALTHEAST (OUTPATIENT)
Dept: INTERNAL MEDICINE | Facility: CLINIC | Age: 84
End: 2019-08-29

## 2019-08-29 DIAGNOSIS — S22.000A COMPRESSION FRACTURE OF BODY OF THORACIC VERTEBRA (H): ICD-10-CM

## 2019-08-30 ENCOUNTER — AMBULATORY - HEALTHEAST (OUTPATIENT)
Dept: PHARMACY | Facility: CLINIC | Age: 84
End: 2019-08-30

## 2019-08-30 DIAGNOSIS — F02.80 DEMENTIA ASSOCIATED WITH OTHER UNDERLYING DISEASE WITHOUT BEHAVIORAL DISTURBANCE (H): ICD-10-CM

## 2019-08-30 DIAGNOSIS — M81.0 OSTEOPOROSIS, UNSPECIFIED OSTEOPOROSIS TYPE, UNSPECIFIED PATHOLOGICAL FRACTURE PRESENCE: ICD-10-CM

## 2019-08-30 DIAGNOSIS — I10 BENIGN ESSENTIAL HYPERTENSION: ICD-10-CM

## 2019-08-30 DIAGNOSIS — I25.10 CORONARY ARTERY DISEASE INVOLVING NATIVE CORONARY ARTERY OF NATIVE HEART WITHOUT ANGINA PECTORIS: ICD-10-CM

## 2019-08-30 DIAGNOSIS — S22.000A COMPRESSION FRACTURE OF BODY OF THORACIC VERTEBRA (H): ICD-10-CM

## 2019-08-30 DIAGNOSIS — K21.9 GASTROESOPHAGEAL REFLUX DISEASE, ESOPHAGITIS PRESENCE NOT SPECIFIED: ICD-10-CM

## 2019-08-30 DIAGNOSIS — Z71.89 ENCOUNTER FOR HERB AND VITAMIN SUPPLEMENT MANAGEMENT: ICD-10-CM

## 2019-08-30 RX ORDER — DOCUSATE SODIUM 50 MG/5ML
100 LIQUID ORAL 2 TIMES DAILY
Qty: 60 ML | Refills: 2 | Status: SHIPPED | OUTPATIENT
Start: 2019-08-30

## 2019-08-30 RX ORDER — LIDOCAINE 4 G/G
1 PATCH TOPICAL EVERY 24 HOURS
Qty: 30 PATCH | Refills: 0 | Status: SHIPPED | OUTPATIENT
Start: 2019-08-30

## 2019-09-04 ENCOUNTER — RECORDS - HEALTHEAST (OUTPATIENT)
Dept: ADMINISTRATIVE | Facility: OTHER | Age: 84
End: 2019-09-04

## 2019-09-04 ENCOUNTER — HOME CARE/HOSPICE - HEALTHEAST (OUTPATIENT)
Dept: HOSPICE | Facility: HOSPICE | Age: 84
End: 2019-09-04

## 2019-09-05 ENCOUNTER — COMMUNICATION - HEALTHEAST (OUTPATIENT)
Dept: INTERNAL MEDICINE | Facility: CLINIC | Age: 84
End: 2019-09-05

## 2019-09-05 ENCOUNTER — HOME CARE/HOSPICE - HEALTHEAST (OUTPATIENT)
Dept: HOSPICE | Facility: HOSPICE | Age: 84
End: 2019-09-05

## 2019-09-06 ENCOUNTER — OFFICE VISIT - HEALTHEAST (OUTPATIENT)
Dept: GERIATRICS | Facility: CLINIC | Age: 84
End: 2019-09-06

## 2019-09-06 ENCOUNTER — HOME CARE/HOSPICE - HEALTHEAST (OUTPATIENT)
Dept: HOSPICE | Facility: HOSPICE | Age: 84
End: 2019-09-06

## 2019-09-06 DIAGNOSIS — R52 PAIN: ICD-10-CM

## 2019-09-06 DIAGNOSIS — R33.9 URINARY RETENTION: ICD-10-CM

## 2019-09-06 DIAGNOSIS — I50.22 CHRONIC SYSTOLIC HEART FAILURE (H): ICD-10-CM

## 2019-09-06 DIAGNOSIS — K92.1 GASTROINTESTINAL HEMORRHAGE WITH MELENA: ICD-10-CM

## 2019-09-06 DIAGNOSIS — Z51.5 HOSPICE CARE PATIENT: ICD-10-CM

## 2019-09-06 DIAGNOSIS — K21.9 GASTROESOPHAGEAL REFLUX DISEASE, ESOPHAGITIS PRESENCE NOT SPECIFIED: ICD-10-CM

## 2019-09-06 DIAGNOSIS — I48.19 PERSISTENT ATRIAL FIBRILLATION (H): ICD-10-CM

## 2019-09-06 DIAGNOSIS — F01.518 VASCULAR DEMENTIA WITH BEHAVIOR DISTURBANCE (H): ICD-10-CM

## 2019-09-07 ENCOUNTER — RECORDS - HEALTHEAST (OUTPATIENT)
Dept: LAB | Facility: CLINIC | Age: 84
End: 2019-09-07

## 2019-09-07 LAB
ANION GAP SERPL CALCULATED.3IONS-SCNC: 8 MMOL/L (ref 5–18)
BUN SERPL-MCNC: 13 MG/DL (ref 8–28)
CALCIUM SERPL-MCNC: 8.7 MG/DL (ref 8.5–10.5)
CHLORIDE BLD-SCNC: 95 MMOL/L (ref 98–107)
CO2 SERPL-SCNC: 29 MMOL/L (ref 22–31)
CREAT SERPL-MCNC: 0.7 MG/DL (ref 0.7–1.3)
ERYTHROCYTE [DISTWIDTH] IN BLOOD BY AUTOMATED COUNT: 13.4 % (ref 11–14.5)
GFR SERPL CREATININE-BSD FRML MDRD: >60 ML/MIN/1.73M2
GLUCOSE BLD-MCNC: 91 MG/DL (ref 70–125)
HCT VFR BLD AUTO: 31.5 % (ref 40–54)
HGB BLD-MCNC: 10.4 G/DL (ref 14–18)
MCH RBC QN AUTO: 30.1 PG (ref 27–34)
MCHC RBC AUTO-ENTMCNC: 33 G/DL (ref 32–36)
MCV RBC AUTO: 91 FL (ref 80–100)
PLATELET # BLD AUTO: 375 THOU/UL (ref 140–440)
PMV BLD AUTO: 9.3 FL (ref 8.5–12.5)
POTASSIUM BLD-SCNC: 3.6 MMOL/L (ref 3.5–5)
RBC # BLD AUTO: 3.45 MILL/UL (ref 4.4–6.2)
SODIUM SERPL-SCNC: 132 MMOL/L (ref 136–145)
WBC: 11.8 THOU/UL (ref 4–11)

## 2019-09-08 ENCOUNTER — RECORDS - HEALTHEAST (OUTPATIENT)
Dept: LAB | Facility: CLINIC | Age: 84
End: 2019-09-08

## 2019-09-08 LAB
ALBUMIN UR-MCNC: ABNORMAL MG/DL
AMORPH CRY #/AREA URNS HPF: ABNORMAL /[HPF]
APPEARANCE UR: ABNORMAL
BACTERIA #/AREA URNS HPF: ABNORMAL HPF
BILIRUB UR QL STRIP: NEGATIVE
COLOR UR AUTO: YELLOW
GLUCOSE UR STRIP-MCNC: NEGATIVE MG/DL
HGB UR QL STRIP: NEGATIVE
KETONES UR STRIP-MCNC: NEGATIVE MG/DL
LEUKOCYTE ESTERASE UR QL STRIP: ABNORMAL
NITRATE UR QL: NEGATIVE
PH UR STRIP: 7 [PH] (ref 4.5–8)
RBC #/AREA URNS AUTO: ABNORMAL HPF
SP GR UR STRIP: 1.01 (ref 1–1.03)
SQUAMOUS #/AREA URNS AUTO: ABNORMAL LPF
UROBILINOGEN UR STRIP-ACNC: ABNORMAL
WBC #/AREA URNS AUTO: ABNORMAL HPF

## 2019-09-09 ENCOUNTER — OFFICE VISIT - HEALTHEAST (OUTPATIENT)
Dept: GERIATRICS | Facility: CLINIC | Age: 84
End: 2019-09-09

## 2019-09-09 DIAGNOSIS — I50.22 CHRONIC SYSTOLIC HEART FAILURE (H): ICD-10-CM

## 2019-09-09 DIAGNOSIS — K92.1 GASTROINTESTINAL HEMORRHAGE WITH MELENA: ICD-10-CM

## 2019-09-09 DIAGNOSIS — I48.19 PERSISTENT ATRIAL FIBRILLATION (H): ICD-10-CM

## 2019-09-09 DIAGNOSIS — F01.518 VASCULAR DEMENTIA WITH BEHAVIOR DISTURBANCE (H): ICD-10-CM

## 2019-09-09 DIAGNOSIS — Z51.5 HOSPICE CARE PATIENT: ICD-10-CM

## 2019-09-09 DIAGNOSIS — M62.81 GENERALIZED MUSCLE WEAKNESS: ICD-10-CM

## 2019-09-09 LAB — BACTERIA SPEC CULT: NORMAL

## 2019-09-10 ENCOUNTER — COMMUNICATION - HEALTHEAST (OUTPATIENT)
Dept: INTERNAL MEDICINE | Facility: CLINIC | Age: 84
End: 2019-09-10

## 2019-09-11 ENCOUNTER — OFFICE VISIT - HEALTHEAST (OUTPATIENT)
Dept: GERIATRICS | Facility: CLINIC | Age: 84
End: 2019-09-11

## 2019-09-11 DIAGNOSIS — I48.19 PERSISTENT ATRIAL FIBRILLATION (H): ICD-10-CM

## 2019-09-11 DIAGNOSIS — R33.9 URINARY RETENTION: ICD-10-CM

## 2019-09-11 DIAGNOSIS — R52 PAIN: ICD-10-CM

## 2019-09-11 DIAGNOSIS — K21.9 GASTROESOPHAGEAL REFLUX DISEASE, ESOPHAGITIS PRESENCE NOT SPECIFIED: ICD-10-CM

## 2019-09-11 DIAGNOSIS — K92.1 GASTROINTESTINAL HEMORRHAGE WITH MELENA: ICD-10-CM

## 2019-09-11 DIAGNOSIS — I50.22 CHRONIC SYSTOLIC HEART FAILURE (H): ICD-10-CM

## 2019-09-12 ENCOUNTER — AMBULATORY - HEALTHEAST (OUTPATIENT)
Dept: GERIATRICS | Facility: CLINIC | Age: 84
End: 2019-09-12

## 2019-10-15 ENCOUNTER — AMBULATORY - HEALTHEAST (OUTPATIENT)
Dept: PALLIATIVE MEDICINE | Facility: OTHER | Age: 84
End: 2019-10-15

## 2019-12-31 ENCOUNTER — RECORDS - HEALTHEAST (OUTPATIENT)
Dept: LAB | Facility: CLINIC | Age: 84
End: 2019-12-31

## 2019-12-31 LAB
ANION GAP SERPL CALCULATED.3IONS-SCNC: 5 MMOL/L (ref 5–18)
BUN SERPL-MCNC: 27 MG/DL (ref 8–28)
CALCIUM SERPL-MCNC: 9.6 MG/DL (ref 8.5–10.5)
CHLORIDE BLD-SCNC: 105 MMOL/L (ref 98–107)
CO2 SERPL-SCNC: 28 MMOL/L (ref 22–31)
CREAT SERPL-MCNC: 0.86 MG/DL (ref 0.7–1.3)
ERYTHROCYTE [DISTWIDTH] IN BLOOD BY AUTOMATED COUNT: 13.4 % (ref 11–14.5)
GFR SERPL CREATININE-BSD FRML MDRD: >60 ML/MIN/1.73M2
GLUCOSE BLD-MCNC: 121 MG/DL (ref 70–125)
HCT VFR BLD AUTO: 40.9 % (ref 40–54)
HGB BLD-MCNC: 13.3 G/DL (ref 14–18)
MCH RBC QN AUTO: 30.6 PG (ref 27–34)
MCHC RBC AUTO-ENTMCNC: 32.5 G/DL (ref 32–36)
MCV RBC AUTO: 94 FL (ref 80–100)
PLATELET # BLD AUTO: 311 THOU/UL (ref 140–440)
PMV BLD AUTO: 9.2 FL (ref 8.5–12.5)
POTASSIUM BLD-SCNC: 3.9 MMOL/L (ref 3.5–5)
RBC # BLD AUTO: 4.34 MILL/UL (ref 4.4–6.2)
SODIUM SERPL-SCNC: 138 MMOL/L (ref 136–145)
WBC: 6.5 THOU/UL (ref 4–11)

## 2021-05-22 ENCOUNTER — HEALTH MAINTENANCE LETTER (OUTPATIENT)
Age: 86
End: 2021-05-22

## 2021-05-25 ENCOUNTER — RECORDS - HEALTHEAST (OUTPATIENT)
Dept: ADMINISTRATIVE | Facility: CLINIC | Age: 86
End: 2021-05-25

## 2021-05-26 ENCOUNTER — RECORDS - HEALTHEAST (OUTPATIENT)
Dept: ADMINISTRATIVE | Facility: CLINIC | Age: 86
End: 2021-05-26

## 2021-05-27 ENCOUNTER — RECORDS - HEALTHEAST (OUTPATIENT)
Dept: ADMINISTRATIVE | Facility: CLINIC | Age: 86
End: 2021-05-27

## 2021-05-27 NOTE — TELEPHONE ENCOUNTER
RN cannot approve Refill Request    RN can NOT refill this medication med is not covered by policy/route to provider. Last office visit: 3/13/2019 Colby Martines MD Last Physical: 8/14/2015 Last MTM visit: Visit date not found Last visit same specialty: 3/13/2019 Colby Martines MD.  Next visit within 3 mo: Visit date not found  Next physical within 3 mo: Visit date not found      Mansi Wang, [facility] Triage/Med Refill 4/16/2019    Requested Prescriptions   Pending Prescriptions Disp Refills     melatonin 10 mg TbER [Pharmacy Med Name: MELATONIN TAB 10MG CR] 28 tablet 11     Sig: TAKE 1 TABLET BY MOUTH AT BEDTIME       There is no refill protocol information for this order

## 2021-05-28 NOTE — PROGRESS NOTES
1. Age-related osteoporosis without current pathological fracture  Basic Metabolic Panel    Vitamin D, Total (25-Hydroxy)    DXA Bone Density Scan     Patient was educated on safety of Prolia utilizing Patient Counseling Chart for Healthcare Providers, as outlined by the Prolia REMS progam.     Return in about 6 months (around 11/2/2019) for Recheck, Prolia injection.    Patient Instructions   Prolia 3rd today.  Prolia 4th in 6 months with my nurse. I will see you in 1 year.    DXA in October 2019 .   Phone number to schedule [phone].    Daily calcium need is 7107-9669 mg a day from the diet and supplements.  Calcium citrate is easier to digest.  Vitamin D 2000 IU daily recommended.      Chief Complaint   Patient presents with     Follow-up     osteo - prolia       /66   Pulse 76   Wt 164 lb (74.4 kg)   BMI 24.22 kg/m        Did you experience any problems with previous Prolia injection? no  Any medication change in the last 6 months? no  Did you take prednisone or other immunosupressant drugs in the last 6 months   (chemo, transplant, rheum, dermatology conditions)? no  Did you have any serious infection in the last 6 months?no  Any recent hospitalizations?no  Do you plan any dental work in the next 2-3 months?no  How much calcium do you take daily from the diet and supplements?1200 mg  How much vit D do you take daily? 2000 IU  Last DXA? 10/2017, Reviewed and discussed      Patient is here today for the 3rd Prolia injection. Patient tolerated previous injections well.   We discussed calcium and vit D daily needs today.   Next Prolia injection will be in 6 months.     15 minutes spent with the patient and more then 50 % of the time in counseling.  This note has been dictated using voice recognition software. Any grammatical or context distortions are unintentional and inherent to the software      Patient Active Problem List   Diagnosis     Coronary Artery Disease     Hemorrhoids      Hypercholesterolemia     Esophageal Reflux     Osteoporosis     Chest Pain     Chronic Sinusitis     Limb Pain     Diarrhea     Urinary Frequency More Than Twice At Night (Nocturia)     Dementia     Coronary artery disease involving native coronary artery of native heart without angina pectoris     Dyslipidemia       Current Outpatient Medications on File Prior to Visit   Medication Sig Dispense Refill     acetaminophen (TYLENOL) 325 MG tablet Take 325 mg by mouth every 6 (six) hours as needed for pain.        aspirin 81 MG EC tablet Take 81 mg by mouth daily.       calcium citrate-vitamin D (CITRACAL+D) 315-200 mg-unit per tablet Take 1 tablet by mouth daily.       cholecalciferol, vitamin D3, 1,000 unit tablet Take 2,000 Units by mouth daily.        clopidogrel (PLAVIX) 75 mg tablet Take 1 tablet by mouth  daily 90 tablet 3     co-enzyme Q-10 30 mg capsule Take 30 mg by mouth 3 (three) times a day.       escitalopram oxalate (LEXAPRO) 5 MG tablet Take 20 mg by mouth.       lidocaine (XYLOCAINE) 5 % ointment Apply two times a day prn for pain to the affected area. 35.44 g 2     melatonin 10 mg TbER TAKE 1 TABLET BY MOUTH AT BEDTIME 28 tablet 11     metoprolol succinate (TOPROL-XL) 50 MG 24 hr tablet Take 1 tablet (50 mg total) by mouth daily. 30 tablet 0     multivitamin with minerals (THERA-M) 9 mg iron-400 mcg Tab tablet Take 1 tablet by mouth daily.       omeprazole (PRILOSEC) 20 MG capsule Take 1 capsule by mouth  daily 90 capsule 3     simvastatin (ZOCOR) 40 MG tablet Take 40 mg by mouth bedtime.       [DISCONTINUED] benzonatate (TESSALON PERLES) 100 MG capsule Take 1 capsule (100 mg total) by mouth every 6 (six) hours as needed for cough. 20 capsule 0     Current Facility-Administered Medications on File Prior to Visit   Medication Dose Route Frequency Provider Last Rate Last Dose     denosumab 60 mg (PROLIA 60 mg/ml)  60 mg Subcutaneous Q6 Months Jerod Epps MD   60 mg at 10/01/18 9034

## 2021-05-28 NOTE — TELEPHONE ENCOUNTER
Left message to call back for: [phone]  Information to relay to patient:  We received a form with the dated of 4/2, 4/30, and 5/16 for fax dates on it, want to know if they have received the one I faxed back on 4/22    Marily Strauss CMA ([facility])

## 2021-05-28 NOTE — PATIENT INSTRUCTIONS - HE
Prolia 3rd today.  Prolia 4th in 6 months with my nurse. I will see you in 1 year.    DXA in October 2019 .   Phone number to schedule [phone].    Daily calcium need is 3909-2596 mg a day from the diet and supplements.  Calcium citrate is easier to digest.  Vitamin D 2000 IU daily recommended.

## 2021-05-28 NOTE — TELEPHONE ENCOUNTER
RN cannot approve Refill Request    RN can NOT refill this medication med is not covered by policy/route to provider. Last office visit: 3/13/2019 Colby Martines MD Last Physical: 8/14/2015 Last MTM visit: Visit date not found Last visit same specialty: 5/2/2019 Jerod Epps MD.  Next visit within 3 mo: Visit date not found  Next physical within 3 mo: Visit date not found      Dinorah Deras, [facility] Triage/Med Refill 5/17/2019    Requested Prescriptions   Pending Prescriptions Disp Refills     melatonin 10 mg TbER 90 tablet 3     Sig: Take 1 tablet by mouth at bedtime.       There is no refill protocol information for this order

## 2021-05-29 ENCOUNTER — RECORDS - HEALTHEAST (OUTPATIENT)
Dept: ADMINISTRATIVE | Facility: CLINIC | Age: 86
End: 2021-05-29

## 2021-05-29 NOTE — TELEPHONE ENCOUNTER
Got another request will fax this one back with the one already done    Marily Strauss CMA ([facility])

## 2021-05-30 ENCOUNTER — RECORDS - HEALTHEAST (OUTPATIENT)
Dept: ADMINISTRATIVE | Facility: CLINIC | Age: 86
End: 2021-05-30

## 2021-05-30 VITALS — WEIGHT: 176 LBS | HEIGHT: 66 IN | BODY MASS INDEX: 28.28 KG/M2

## 2021-05-30 NOTE — PATIENT INSTRUCTIONS - HE
Recommend continuing to wear turtle hard shell back brace for 3 weeks during the daytime however removing at nighttime or when he is lying down to ensure he does not have skin breakdown.  At that point if pain is stable recommend weaning off the back brace by removing it for 2 hours a day for 1 week, then 4 hours a day for 1 week, then 8 hours a day for 1 week and then it can be removed indefinitely at that point if tolerating.  Also recommend patient be able to lay down during the daytime as needed, brace may be removed during this time.    Discussed the importance of core strengthening, ROM, stretching exercises with the patient and how each of these entities is important in decreasing pain.  Explained to the patient that the purpose of physical therapy is to teach the patient a home exercise program.  These exercises need to be performed every day in order to decrease pain and prevent future occurrences of pain.        ~Please call Nurse Navigation line [phone] with any questions or concerns about your treatment plan, if symptoms worsen and you would like to be seen urgently, or if you have problems controlling bladder and bowel function.  ~Follow Up Appointment time slots with Viktoria Milan C[facility], are also available at the [facility] location near [facility] on the first and third THURSDAY afternoons of each month.

## 2021-05-30 NOTE — TELEPHONE ENCOUNTER
Medication Question or Clarification  Who is calling: Other: Yudi nurse at [facility]  What medication are you calling about? (include dose and sig)   oxyCODONE (ROXICODONE) 5 MG immediate release tablet  2.5 mg, Oral, Every 3 hours PRN         Summary: Take 0.5 tablets (2.5 mg total) by mouth every 3 (three) hours as needed for pain., Starting Tue 7/9/2019, Print   Dose, Frequency: 2.5 mg, Every 3 hours PRN        Who prescribed the medication?: Dr Tovar hosptialist  What is your question/concern?: The patient is back at [facility] after hospital discharge.  Patient was treated for fall resulting in back fracture.  The current order for pain medication is not effective.  Nurse is asking if Dr Martines would consider scheduling the oxycodone versus PRN.  Also could the does of be changed from 2.5 mg tab to 5 mg three times a day. Please advise.    Pharmacy: [facility]  Okay to leave a detailed message?: Yes  [phone]  Site CMT - Please call the pharmacy to obtain any additional needed information.

## 2021-05-30 NOTE — TELEPHONE ENCOUNTER
Left message to call back for: Yudi  Information to relay to patient:  Script will not fax to pharmacy, need to know how to get her the script    Marily Strauss CMA ([facility])

## 2021-05-30 NOTE — TELEPHONE ENCOUNTER
Please find out exactly what kind of order they are looking for and we will try to accommodate them.

## 2021-05-30 NOTE — TELEPHONE ENCOUNTER
Who is calling:  Ross  Reason for Call:  Requesting that the patients appointment dates and times be faxed to the [facility] at [phone].  Date of last appointment with primary care: 5/2/2019  Okay to leave a detailed message: Yes

## 2021-05-30 NOTE — TELEPHONE ENCOUNTER
This printed for the 3rd time, faxed to [facility] and will mail the hard copy to them today.    Marily Strauss CMA ([facility])

## 2021-05-30 NOTE — TELEPHONE ENCOUNTER
Medication Question or Clarification  Who is calling: Pharmacy: [facility]  What medication are you calling about? (include dose and sig) oxycodone.  The pharmacy has multiple rx for oxycodone but the directions do not match.  What is the order the patient is takiing?  1 tab every 4 PRN or 1 tab every 8 PRN Please call or send to pharmacy ASAP.   Who prescribed the medication?:Dr Martines  What is your question/concern?: Please advise  Pharmacy:[facility]  Okay to leave a detailed message?: Yes [phone]    Site CMT - Please call the pharmacy to obtain any additional needed information.

## 2021-05-30 NOTE — TELEPHONE ENCOUNTER
FYI - Status Update  Who is Calling: Ross with [facility]  Update: Requesting the clinic reach out to the pharmacy to clarify additional questions the pharmacy has in regards to the Oxycodone prescription.. Please advise, and reach out to Ross with additional questions.  Okay to leave a detailed message?:  Yes

## 2021-05-30 NOTE — TELEPHONE ENCOUNTER
Who is calling:  Ross from [facility]  Reason for Call:  Calling to check on below request. Reports they will need a new prescription. Please advise!  Date of last appointment with primary care: today  Okay to leave a detailed message: Yes

## 2021-05-30 NOTE — TELEPHONE ENCOUNTER
Orders being requested: Physical Therapy for continue treatment .  - One time this week .   - Three times per week next two weeks .  - Two times per week for two weeks .   Reason service is needed/diagnosis: repeated falls   When are orders needed by: ASAP  Where to send Orders: Phone:  [phone]  Okay to leave detailed message?  Yes  [phone]          Orders being requested: Occupational Therapy for Evaluvation   Reason service is needed/diagnosis: Dementia   When are orders needed by: ASAP  Where to send Orders: Phone:  [phone]  Okay to leave detailed message?  Yes   [phone]

## 2021-05-30 NOTE — TELEPHONE ENCOUNTER
Orders being requested: Occupational Therapy twice a week for five weeks.  Reason service is needed/diagnosis: Cognition, Gait, and Transfer Trainng  When are orders needed by: As Able  Where to send Orders: Please give verbal orders to Marina at: [phone]  Okay to leave detailed message?  Yes

## 2021-05-30 NOTE — PROGRESS NOTES
[facility] Follow Up Note    Manjeet Oshea   84 y.o. male    Date of Visit: 7/24/2019    Chief Complaint   Patient presents with     Hospital Visit Follow Up     Subjective  This is an 84-year-old man with known dementia.  He has been living in an assisted living type of facility.  Recently he suffered a fall with compression fractures of his back.  He was briefly hospitalized and subsequently discharged back to his residence.  He continued to have issues with falling and loss of balance.  He did return to the emergency room and I did review those notes from last week.  He is now in a brace but his family reports that he is not doing well.  His pain is severe and he is requiring regular oxycodone.  The brace does not seem to be helping.  Some concerns were also raised about whether or not he is getting adequate help in his current living situation.  He does not appear to be eating.  He is unable to walk at all.  Therapy has been ordered but I am not sure he is in any position to do therapy at this point in time.    ROS A comprehensive review of systems was performed and was otherwise negative    Medications, allergies, and problem list were reviewed and updated    Exam  General Appearance:   On examination his blood pressure is 118/62.    Heart rhythm is stable with rate of 88 and no ectopy.    The patient is in a wheelchair.  He is wearing his brace.  We did not remove it.    No new edema.    The patient is fairly lethargic and not really able to answer any questions today.      Assessment/Plan  1. Compression fracture of body of thoracic vertebra (H)     2. 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  I had a lengthy discussion with the family including his daughter who was on the telephone.  There are no really good solutions to the problem.  I did suggest we up his oxycodone to 5 mg every 4 hours  to try and keep him free of pain.  They can certainly encourage him to eat at the facility and they need to be very cautious when putting him in the wheelchair or getting him to the bathroom or turning him in bed.  I explained to the family why transitional care is not an option at this point and why it is unlikely they would hospitalize him.  Other than the change in pain medication I encouraged the family to see if they could raise the level of care which would probably be helpful all the way around.    Ongoing dementia.  This is not likely to improve and in fact, will probably worsen because of the physical injuries to his back.  No additional suggestions at this time.    The family will keep me informed of what is happening we will see if there are any other accommodations we can make to keep him more comfortable.    Total time of my visit was 30 minutes with greater than 50% of the time spent in counseling and care coordination.  Body Mass Index was not assessed due to orthopedic equipment.    Colby Martines MD      Current Outpatient Medications on File Prior to Visit   Medication Sig     acetaminophen (TYLENOL) 500 MG tablet Take 2 tablets (1,000 mg total) by mouth 3 (three) times a day.     aspirin 81 MG EC tablet Take 81 mg by mouth daily.     calcium citrate-vitamin D (CITRACAL+D) 315-200 mg-unit per tablet Take 1 tablet by mouth daily.     cholecalciferol, vitamin D3, 1,000 unit tablet Take 2,000 Units by mouth daily.      clopidogrel (PLAVIX) 75 mg tablet Take 1 tablet by mouth  daily     co-enzyme Q-10 30 mg capsule Take 30 mg by mouth 3 (three) times a day.     escitalopram oxalate (LEXAPRO) 20 MG tablet Take 20 mg by mouth at bedtime.     lidocaine (XYLOCAINE) 5 % ointment Apply two times a day prn for pain to the affected area.     melatonin 10 mg TbER Take 1 tablet by mouth at bedtime.     metoprolol succinate (TOPROL-XL) 50 MG 24 hr tablet Take 1 tablet (50 mg total) by mouth daily.      multivitamin with minerals (THERA-M) 9 mg iron-400 mcg Tab tablet Take 1 tablet by mouth daily.     omeprazole (PRILOSEC) 20 MG capsule Take 1 capsule by mouth  daily     oxyCODONE (ROXICODONE) 5 MG immediate release tablet TAKE 1 TABLET BY MOUTH EVERY 8 HOURS;TAKE 1/2 TABLET (2.5MG) BY MOUTH EVERY 3 HOURS AS NEEDED FOR PAIN     polyethylene glycol (MIRALAX) 17 gram packet Take 1 packet (17 g total) by mouth daily as needed.     simvastatin (ZOCOR) 40 MG tablet Take 40 mg by mouth bedtime.     Current Facility-Administered Medications on File Prior to Visit   Medication     denosumab 60 mg (PROLIA 60 mg/ml)     No Known Allergies  Social History     Tobacco Use     Smoking status: Former Smoker     Smokeless tobacco: Never Used   Substance Use Topics     Alcohol use: No     Drug use: No

## 2021-05-30 NOTE — TELEPHONE ENCOUNTER
Controlled Substance Refill Request  Medication:   Requested Prescriptions     Pending Prescriptions Disp Refills     oxyCODONE (ROXICODONE) 5 MG immediate release tablet [Pharmacy Med Name: OXYCODONE TAB 5MG] 21 tablet 0     Sig: TAKE 1 TABLET BY MOUTH EVERY 8 HOURS;TAKE 1/2 TABLET (2.5MG) BY MOUTH EVERY 3 HOURS AS NEEDED FOR PAIN     Date Last Fill: 7/24/19  Pharmacy: [facility]   Submit electronically to pharmacy    Controlled Substance Agreement on File:   Encounter-Level CSA Scan Date:    There are no encounter-level csa scan date.       Last office visit with primary: 7/24/2019

## 2021-05-30 NOTE — TELEPHONE ENCOUNTER
Left message on nursing director's voice mail asking her to call back with exactly what they are looking for and a direct contact number. No number listed in original message. Googled the number to [facility]. [phone]    Kym Duffy, [facility]

## 2021-05-30 NOTE — TELEPHONE ENCOUNTER
Controlled Substance Refill Request  Medication:   Requested Prescriptions     Pending Prescriptions Disp Refills     oxyCODONE (ROXICODONE) 5 MG immediate release tablet [Pharmacy Med Name: OXYCODONE TAB 5MG] 1 tablet 0     Sig: TAKE 1 TABLET BY MOUTH EVERY 8 HOURS;TAKE 1/2 TABLET (2.5MG) BY MOUTH EVERY 3 HOURS AS NEEDED FOR PAIN     Date Last Fill: 7/18/19  [facility] [place]   Submit electronically to pharmacy  Controlled Substance Agreement on File:   Encounter-Level CSA Scan Date:    There are no encounter-level csa scan date.       Last office visit: Last office visit pertaining to requested medication was 5/2/19.

## 2021-05-30 NOTE — TELEPHONE ENCOUNTER
Dr. Martines,  Please advise on which sig you would like the assisted living to follow.  Thank you.  Jayashree MOHAN, ASHLEE/SURENDRA....................11:32 AM

## 2021-05-30 NOTE — TELEPHONE ENCOUNTER
Called pharmacy today and was able to fax it to them and mail the hard copy    Marily Strauss CMA ([facility])

## 2021-05-30 NOTE — TELEPHONE ENCOUNTER
Medication Question or Clarification  Who is calling: Other: Nurse at Assisted Living Facility   What medication are you calling about? (include dose and sig)   oxyCODONE (ROXICODONE) 5 MG immediate release tablet 21 tablet 0 7/22/2019     Sig: TAKE 1 TABLET BY MOUTH EVERY 8 HOURS;TAKE 1/2 TABLET (2.5MG) BY MOUTH EVERY 3 HOURS AS NEEDED FOR PAIN        Who prescribed the medication?: Colby Martines MD    What is your question/concern?: Nurse is calling to state: They have 2 different orders . 1 states increase Oxy to 5 mg every 4 hours , Prn . ( Which is what Nurse advises as patient is in pain . Or the orders as stated on Rx ?   PLEASE CALL AND CLARIFY AYLIN Hawkins # [phone]      Pharmacy: [facility] - [address] to leave a detailed message?: Yes  Site CMT - Please call the pharmacy to obtain any additional needed information.  Increase oxycodone   5 mg every 4 hours PRN

## 2021-05-30 NOTE — TELEPHONE ENCOUNTER
Patient Returning Call  Reason for call:   Yudi returning voicemail from clinic  Information relayed to patient:  Relayed below message, caller could not provide fax number for pharmacy, however, gave a phone number- [facility] phone number: [phone].  Patient has additional questions:  No  If YES, what are your questions/concerns:  na  Okay to leave a detailed message?: No call back needed

## 2021-05-30 NOTE — TELEPHONE ENCOUNTER
Call from [facility] Nurse       Pt seen at ED on the 18th       Will need new Rx to reflect increased dosing schedule from ED visit       Per discharge instructions:      Your pain medicine can be upped as needed, if you are taking 5 mg oxycodone every 8 hours this can be increased to every 6 hours, or even  2 at a time. While you were here in the emergency department pain was reported as being tolerable. All of these fractures are nonsurgical and will simply need time and pain management.    I will message provider to address - Pain medication is addressed during clinic hours but will message them urgently for visibility         Sesar Newton, RN   Triage and Medication Refills

## 2021-05-30 NOTE — PROGRESS NOTES
Assessment:     Diagnoses and all orders for this visit:    Chronic bilateral low back pain with right-sided sciatica  -     Ambulatory referral to Pain Clinic    Right lumbar radiculitis  -     Ambulatory referral to Pain Clinic    Closed compression fracture of L2 lumbar vertebra with delayed healing, subsequent encounter  -     Ambulatory referral to Pain Clinic    History of compression fracture of spine  -     Ambulatory referral to Pain Clinic    History of lumbar surgery  -     Ambulatory referral to Pain Clinic       Manjeet Oshea is a 84 y.o. y.o. male previous patient of RAZA Wright last seen 8/1/2018 with past medical history significant for coronary artery disease, hypercholesterolemia, severe reflux disease, osteoporosis who presents today for follow-up regarding:    -Acute on chronic generalized thoracic and lumbar spine pain with right lumbar radiculitis posterior thigh and anterior thigh, recent falls 6/29/2019 and 7/4/2019 with increased pain.  Multilevel chronic compression fracture, in comparison to previous MRI 2018 all fractures look stable except for L2 compression fracture has progressed, no retropulsion.  Severe compression fracture T8 vertebral body however this is similar to 2018 thoracic x-ray therefore more likely chronic in nature, no retropulsion noted at any level.  No burst type compression fracture noted.    -Fractures noted T8-T12 greatest at T8 as well as L1, L2, L3 noted on CT scan, unable to determine acuity however with CT alone. Several left-sided posterior rib fractures and right posterior eighth rib fracture.  Acute bilateral sacral alae fracture.    Patient is neurologically intact on exam.    Plan:     A shared decision making plan was used. The patient's values and choices were respected. Prior medical records from 8/1/2018 to current were reviewed today. The following represents what was discussed and decided upon by the provider and the patient.         -DIAGNOSTIC TESTS: Images were personally reviewed and interpreted.   --No further imaging recommendations at this time.  --Bone density scan was ordered by Dr. Epps 5/2/2019.  --Did discuss that we could order thoracic and lumbar spine MRI to further evaluate the acuity of previous compression fractures at L2 and other levels this would help us determine which fractures are new however no concerning retropulsion was noted on CT scan.  Patient and family defer further imaging today as he has a significant hard time lying flat and is not interested in further imaging at this time as they did not feel it would change their plan.  He is not interested in surgery of any type at this time.  --Lumbar CT 7/19/2019 stable in comparison to 7/7/2019.  L1-L3 compression fractures noted.  Stable bilateral nondisplaced sacral alae fracture.  --Thoracic CT 7/19/2019 shows no thoracic spine fracture.  Stable compression fracture T8-T12 most severe at T8 similar to chest CT 7/7/2019.  --Lumbar CT scan 7/7/2019 shows acute bilateral sacral ala fracture.  Chronic fractures noted at L1, L2, L3.  Postoperative changes, laminectomy left L3-4 through L5-S1.  Moderate to severe right L3-4 foraminal stenosis with multilevel moderate foraminal stenosis.  --CT chest/abdomen/pelvis shows multilevel thoracic compression fracture.  Greatest at T8 with 50% height loss.  --Cervical spine CT scan 7/7/2019 shows no acute fracture.  Osteopenia and multiple level degenerative changes.  Multilevel moderate C3-4 and C5-6 spinal stenosis with multilevel high-grade foraminal stenosis.    -Referral: Referral placed to [facility] pain clinic to further discuss ongoing management for his chronic pain as well as acute flareup in his pain.  He would like to talk about further opioid medications as well as non-opioids, interested in discussing at least medical marijuana as well which he states his primary is also on board with.  Discussed with patient  that that is not something we can prescribe here at the spine center.    -INTERVENTIONS: No recommendations for spinal injections at this time due to acute fracture.  Discussed in the next 4 weeks if symptoms are not improving we could consider right lumbar FOREST if his right leg pain/sciatica is still bothersome.  Currently his pain is nonspecific dermatomal pattern however.  -Patient is a poor candidate for vertebroplasty/kyphoplasty.    -MEDICATIONS: No change in medications recommend following up with PCP in the meantime for oxycodone needs otherwise pain clinic ongoing for medication management.  Discussed side effects of medications and proper use. Patient verbalized understanding.    -PHYSICAL THERAPY: Encourage patient to continue physical therapy which he is doing currently at his living facility and tolerating okay.  Discussed the importance of core strengthening, ROM, stretching exercises with the patient and how each of these entities is important in decreasing pain.  Explained to the patient that the purpose of physical therapy is to teach the patient a home exercise program.  These exercises need to be performed every day in order to decrease pain and prevent future occurrences of pain.        -Back brace: Advised patient that he should still continue to wear his back brace for his acute L2 compression fracture for the next 3 to 4 weeks during the daytime however he can remove it at nighttime or when he is lying down to ensure he does not have skin breakdown.  In the next 3 to 4 weeks if his pain is stable and not worsening he can trial weaning off the back brace by removing it for 2 hours a day for around a week and then 4 hours a day for a week and then 8 hours a day for a week and then he can remove it indefinitely beyond that point if tolerating.    -PATIENT EDUCATION:  40 minutes of total visit time was spent face to face with the patient today, 60 % of the visit was spent on counseling, education,  and coordinating care.   -5 minutes spent outside of visit time, non-face-to-face time, reviewing chart.    -FOLLOW UP: Follow-up with [facility] pain clinic.  Discussed that as long as symptoms are improving and stabilizing, he does not need to be seen again at the spine center unless symptoms change or pain clinic requests reevaluation.  Advised to contact clinic if symptoms worsen or change.    Subjective:     Manjeet Oshea is a 84 y.o. male who presents today for follow-up regarding ongoing chronic pain that is very nonspecific as patient is a very poor historian and unable to explain his pain.  He does report that he had a fall however on 7/29/2019 with minimal increased pain but he also had another fall on 7/4/2019 at his living facility where he reports his back and right leg pain has worsened.  His pain in his right leg is in the posterior thigh, however he also points to the anterior thigh when describing his pain.  Currently patient reports his pain is a constant 10/10.    During exam H&P today patient was very lethargic, would follow commands easily but most often had his eyes closed the entire time.    *Patient was seen in the ED 7/9/2019 and hospitalized, was evaluated by [facility] at that time as well and they ordered a TLSO brace physical therapy to start in 2 weeks and they will did recommend following up with Dr. Chairez with [facility] at that time as well.  Patient was then also seen again on 7/19/2019 ED for ongoing LBP with presence of known fractures acute on chronic.    Patient's female friend was present today during entire visit, and daughter was present via telephone and both added to past medical/surgical/family/social history and history of presenting illness.     *ED visit 7/19/2019 for acute on chronic pain with presence of known fractures thoracic, lumbar and sacral areas.    Treatment to Date: History of lumbar surgery.  Physical therapy currently at [facility]  assisted living facility, [facility].    Medications:  Oxycodone managed by PCP with benefit  Tylenol    Patient Active Problem List   Diagnosis     Coronary Artery Disease     Hemorrhoids     Hypercholesterolemia     Esophageal Reflux     Osteoporosis     Chest Pain     Chronic Sinusitis     Limb Pain     Diarrhea     Urinary Frequency More Than Twice At Night ([place])     Dementia     Coronary artery disease involving native coronary artery of native heart without angina pectoris     Dyslipidemia     Compression fracture of body of thoracic vertebra (H)       Current Outpatient Medications on File Prior to Encounter   Medication Sig Dispense Refill     acetaminophen (TYLENOL) 500 MG tablet Take 2 tablets (1,000 mg total) by mouth 3 (three) times a day.  0     aspirin 81 MG EC tablet Take 81 mg by mouth daily.       calcium citrate-vitamin D (CITRACAL+D) 315-200 mg-unit per tablet Take 1 tablet by mouth daily.       cholecalciferol, vitamin D3, 1,000 unit tablet Take 2,000 Units by mouth daily.        clopidogrel (PLAVIX) 75 mg tablet Take 1 tablet by mouth  daily 90 tablet 3     co-enzyme Q-10 30 mg capsule Take 30 mg by mouth 3 (three) times a day.       escitalopram oxalate (LEXAPRO) 20 MG tablet Take 20 mg by mouth at bedtime.       lidocaine (XYLOCAINE) 5 % ointment Apply two times a day prn for pain to the affected area. 35.44 g 2     melatonin 10 mg TbER Take 1 tablet by mouth at bedtime. 90 tablet 3     metoprolol succinate (TOPROL-XL) 50 MG 24 hr tablet Take 1 tablet (50 mg total) by mouth daily. 30 tablet 0     multivitamin with minerals (THERA-M) 9 mg iron-400 mcg Tab tablet Take 1 tablet by mouth daily.       omeprazole (PRILOSEC) 20 MG capsule Take 1 capsule by mouth  daily 90 capsule 3     oxyCODONE (ROXICODONE) 5 MG immediate release tablet Take 1 tablet (5 mg total) by mouth every 4 (four) hours as needed for pain. 30 tablet 0     oxyCODONE (ROXICODONE) 5 MG immediate release  tablet TAKE 1 TABLET BY MOUTH EVERY 4 HOURS AS NEEDED DX PAIN 30 tablet 0     polyethylene glycol (MIRALAX) 17 gram packet Take 1 packet (17 g total) by mouth daily as needed.  0     simvastatin (ZOCOR) 40 MG tablet Take 40 mg by mouth bedtime.       Current Facility-Administered Medications on File Prior to Encounter   Medication Dose Route Frequency Provider Last Rate Last Dose     denosumab 60 mg (PROLIA 60 mg/ml)  60 mg Subcutaneous Q6 Months Jerod Epps MD   60 mg at 05/02/19 1542       No Known Allergies    Past Medical History:   Diagnosis Date     Asthma      Chronic back pain      Chronic sinusitis      Coronary artery disease      Dementia      Dyslipidemia      GERD (gastroesophageal reflux disease)      Hemorrhoids      HTN (hypertension)      Hyperlipidemia      Osteoporosis      TIA (transient ischemic attack)         Review of Systems  ROS: Positive for balance problems, chronic bladder incontinence for many years, recent falls as noted in history.  Specifically negative for bowel dysfunction,  headache, dizziness, foot drop, fevers, chills, appetite changes, nausea/vomiting, unexplained weight loss. Otherwise 13 systems reviewed are negative. Please see the patient's intake questionnaire from today for details.    Reviewed Social, Family, Past Medical and Past Surgical history with patient, no significant changes noted since prior visit.     Objective:     /55 (Patient Site: Right Arm, Patient Position: Sitting)   Pulse 84   SpO2 95%     PHYSICAL EXAMINATION:    --CONSTITUTIONAL: Well developed, well nourished, healthy appearing individual.  --PSYCHIATRIC: Appropriate mood and affect. No difficulty interacting due to temper, social withdrawal, or memory issues.  --SKIN: Lumbar region is dry and intact.   --RESPIRATORY: Normal rhythm and effort. No abnormal accessory muscle breathing patterns noted.   --LUMBAR SPINE: Thoracic kyphosis noted.  No tenderness to palpation lumbar spine.    --LOWER EXTREMITY MOTOR TESTING:  Plantar flexion left 5/5, right 5/5   Dorsiflexion left 5/5, right 5/5   Great toe MTP extension left 5/5, right 5/5  Knee flexion left 5/5, right 5/5  Knee extension left 5/5, right 5/5   Hip flexion left 5/5, right 5/5  Hip abduction left 5/5, right 5/5  Hip adduction left 5/5, right 5/5   --HIPS: Full range of motion bilaterally.   --NEUROLOGIC: Bilateral patellar and achilles reflexes are diminished 1/4 and symmetric. Sensation to light touch is intact in the bilateral L4, L5, and S1 dermatomes.    RESULTS:   Imaging: Lumbar spine imaging was reviewed today. The images were shown to the patient and the findings were explained using a spine model.      Ct Head Without Contrast  Ct Cervical Spine Without Contrast  Result Date: 7/7/2019  INDICATION: Multiple falls, head injury. Anticoagulated. HEAD CT: Routine. Dose reduction techniques were used. CERVICAL SPINE CT: Axial images were obtained through the cervical spine and were evaluated in the axial plane with sagittal and coronal reformations. Dose reduction techniques were used. CONTRAST: None. COMPARISON:  10/24/2017 head CT. FINDINGS: HEAD CT: No skull fracture. No scalp hematoma. No intracranial hemorrhage, extraaxial collection, mass effect or CT evidence of acute infarct.  Mild to moderate presumed chronic small vessel ischemic changes. Moderate to marked generalized volume loss. The ventricles are proportional to the sulci. Calcification at the distal internal carotid arteries bilaterally. Slight benign thinning of the bilateral parietal bones. Postoperative changes to the bilateral lenses. Mastoid air cells and paranasal sinuses are clear. CERVICAL SPINE CT: No identifiable acute fracture. No posttraumatic subluxation. Rightward curvature in the upper thoracic spine. Exaggerated cervical lordosis. Minimal anterolisthesis of C4 on C5, C5 on C6, C6 on C7 and C7 on T1. Marked atlantodental degenerative change with a  moderate-sized partially calcified pannus posterior to the dens. Mild C3-C4 and mild C5-C6 degenerative disc disease. Multilevel facet arthropathy is moderate to severe on the left and mild on the right. Partial fusion across the bilateral C2-C3 facets. Multilevel small posterior disc osteophyte complexes with multilevel partially calcified ligamentum flavum hypertrophy. This contributes to a moderate C3-C4 and a moderate C5-C6 spinal canal stenosis. Multilevel mild spinal canal stenosis elsewhere. Mild to moderate right and moderate to severe left C3-C4 neural foraminal stenosis. Mild right and moderate to severe left C4-C5 neural foraminal stenosis. Moderate bilateral C5-C6 neural foraminal stenosis. Additional moderate bilateral C6-C7 neural foraminal stenosis. Visualized lung apices are clear. Remainder negative.   CONCLUSION:   HEAD CT: 1.  Stable age-related changes with no acute intracranial abnormality.   CERVICAL SPINE CT:   1.  No fracture or post-traumatic subluxation.   2.  Diffuse osteopenia with multilevel degenerative change as detailed above. Multilevel moderate C3-C4 and C5-C6 spinal canal stenosis. Multilevel high-grade neural foraminal stenosis is as detailed above.       Ct Thoracic Spine Without Contrast  Ct Lumbar Spine Without Contrast  Result Date: 7/19/2019  INDICATION: Back pain. Known compression fractures. Technique: THORACIC SPINE CT: Routine without IV contrast. Multiplanar reformats.  Dose reduction techniques were used. LUMBAR SPINE CT: Routine without IV contrast. Multiplanar reformats.  Dose reduction techniques were used. Comparison: CT chest 7/7/2019. CT lumbar spine 7/7/2019. FINDINGS: THORACIC SPINE CT: VERTEBRA: Generalized osteopenia. Lateral thoracic alignment stable. Stable right thoracolumbar curvature on the coronal view. Stable severe compression fracture of the T8 vertebral body with stable milder compression fractures of T9, T10, T11 and T12. No new thoracic compression  fracture. No involvement of the central spinal canal. No new posterior element fracture. There are subacute appearing healing left ninth through 12th posterior rib fractures similar to prior chest CT. Healing posterior right eighth rib fracture also noted. CANAL/FORAMINA: Grossly the central canal in the thoracic region is adequate. PARASPINAL: No extraspinal abnormality. LUMBAR SPINE CT: VERTEBRA: Generalized osteopenia. No change in the at least moderate compression fractures involving the endplates of L1 and L2 with mild to moderate compression deformity at L3. No new lumbar compression fracture. No new alignment abnormality or extension of the fractures into the spinal canal. No new posterior element fracture. Stable appearing vertically oriented bilateral sacral alar fractures.  CANAL/FORAMINA: Grossly the central canal is adequate in the lumbar region. PARASPINAL: No extraspinal abnormality.   CONCLUSION:   THORACIC SPINE CT:   1.  No new thoracic spine fracture or alignment abnormality compared to recent chest CT 7/7/2019.   2.  Stable compression fractures involving T8-T12, most severe at T8. No extension of these fractures into the spinal canal.   3.  Several left-sided posterior rib fractures noted and unchanged from prior chest CT with a right posterior eighth rib fracture also unchanged.   4.  Grossly the central canal is adequate in the thoracic region.   LUMBAR SPINE CT:   1.  Overall appearance of the lumbar spine CT is stable from 7/7/2019. Stable chronic appearing L1-L3 compression fractures. No new lumbar compression fracture or new posterior element fracture.   2.  Stable bilateral nondisplaced sacral ala fractures.   3.  Grossly the central canal in the lumbar region is adequate. Previous laminectomy changes in the lower lumbar spine again seen.       Ct Lumbar Spine Without Contrast  Result Date: 7/7/2019  INDICATION: Back pain. Trauma. COMPARISON: 09/26/2017 lumbar spine plain film and  08/29/2017 lumbar spine MRI. TECHNIQUE: Routine without IV contrast. Multiplanar reformats.  Dose reduction techniques were used. FINDINGS: Five nonrib-bearing lumbar-type vertebrae. While marked diffuse osteopenia limits evaluation for an acute nondisplaced fracture, there appears to be an acute comminuted fracture in the left sacral ala, with an additional subtle fracture through the right  sacral ala. No definite acute lumbar spine fracture, with chronic appearing compression deformities of the L1-L3 vertebral bodies. Leftward curvature to the lower lumbar spine with an exaggerated lumbar lordosis. Moderate to severe L5-S1 degenerative disc disease. Moderate L4-L5 degenerative disc disease. Moderate to severe bilateral L4-L5 and moderate bilateral L5-S1 facet arthropathy. No high-grade spinal canal stenosis. Moderate to severe right L3-L4 neural foraminal stenosis with additional moderate right L4-L5 and L5-S1 neural foraminal stenosis. On the left, there is a moderate L3-L4 and a moderate L4-L5 neural foraminal stenosis. Postoperative changes of a left hemilaminectomy at L3-L4, L4-L5, and L5-S1. Chronic posterior left twelfth rib fracture. Please see separate CT chest, abdomen and pelvis regarding abdominal solid organ findings.   CONCLUSION:   1.  Marked diffuse osteopenia. This limits evaluation for an acute nondisplaced fracture. Within these limits, there is no identifiable acute lumbar spine fracture, with chronic fractures involving the L1, L2, and L3 vertebral bodies.   2.  Acute bilateral sacral ala fractures.   3.  Previous postoperative changes of a left L3-L4 through L5-S1 hemilaminectomy. Multilevel degenerative change in the lumbar spine is detailed above, with no high-grade spinal canal stenosis at any level. There is a moderate to severe right L3-L4 neural foraminal stenosis with multilevel moderate neural foraminal stenosis elsewhere.   4.  Please see separate CT chest, abdomen and pelvis  regarding abdominal solid organ findings.       Ct Chest Abdomen Pelvis Without Oral With Iv Contrast  Result Date: 7/7/2019  INDICATION: falls, trauma back pain falls trauma back apin COMPARISON: Chest radiograph dated 8/10/2018. CT abdomen pelvis dated 8/2/2016. TECHNIQUE: Helical thin-section CT scan of the chest, abdomen, and pelvis was performed following injection of IV contrast. Multiplanar reformats were obtained. Dose reduction techniques were used. CONTRAST: Iohexol (Omni) 100 mL FINDINGS:   CHEST: No pneumothorax, pleural effusion or focal consolidation. Mild atelectasis or scarring at the lung bases adjacent to hiatal hernia. Mild apical pleural scarring bilaterally. No mediastinal or hilar lymphadenopathy. Moderate to large hiatal hernia.  Postoperative changes from coronary artery bypass graft with extensive coronary artery calcification. No pericardial effusion.    ABDOMEN: Anterior abdominal wall hernia containing only fat no evidence for bowel obstruction or strangulation. The liver, spleen, pancreas, adrenal glands and kidneys are normal. There is no abdominal aortic aneurysm. No bowel obstruction or free intraperitoneal air. No evidence for appendicitis.   PELVIS: No evidence for diverticulitis or abscess. No bladder stone or mass. No evidence for appendicitis. There is arteriovascular calcification. No inguinal hernia.   MUSCULOSKELETAL: Multiple thoracic and vertebral compression fractures of the most severe compression is at T8 with loss of greater than 50% of vertebral body height. Less pronounced fractures are seen at L1 and L2 the L2 fracture appears new from the abdominal CT of 8/2/2016. There are bilateral hip nails. There is multilevel degenerative disc disease. Patient is status post median sternotomy.   CONCLUSION:   1.  Multiple thoracic vertebral compression fractures. Compression fracture at L2 does not appear acute but is new from previous abdominal CT of 8/2/2016. Other fractures  are age indeterminate. No pneumothorax or pleural effusion.   2.  Moderate to large hiatal hernia. Lungs are clear.   3.  Small anterior abdominal wall hernia containing only fat no bowel obstruction or strangulation.   4.  Bilateral old hip fractures.

## 2021-05-30 NOTE — TELEPHONE ENCOUNTER
Dr. Martines,  Spoke with [facility] patient's RN, who states that they need a refill of the medication for the patient.  Refill has been set up for review.    Prescription Monitoring Program activity reviewed with no discrepancies noted.      Last fill per : 07/22/19  Quantity/days supply: #21 for 3 days by Dr. Bandar Calero    Controlled Substance Agreement on file: No  Date:     Last office visit with provider:  7/24/2019 Colby Martines MD    Please advise.    Jayashree MOHAN CMA/SURENDRA....................1:32 PM

## 2021-05-30 NOTE — TELEPHONE ENCOUNTER
Spoke with Scooter at [facility] pharmacy and clarified his questions regarding the patient's prescription.  He verbalized understanding and had no further questions at this time.    Spoke with Briseida at [facility] and let her know that we have spoken with the pharmacy and the prescription has been clarified.  She verbalized understanding and had no further questions at this time.  Jayashree MOHAN CMA/SURENDRA....................4:18 PM

## 2021-05-31 VITALS — BODY MASS INDEX: 24.56 KG/M2 | WEIGHT: 152.8 LBS | HEIGHT: 66 IN

## 2021-05-31 VITALS — BODY MASS INDEX: 24.26 KG/M2 | WEIGHT: 150.3 LBS

## 2021-05-31 VITALS — BODY MASS INDEX: 24.53 KG/M2 | WEIGHT: 152 LBS

## 2021-05-31 VITALS — WEIGHT: 152 LBS | BODY MASS INDEX: 24.53 KG/M2

## 2021-05-31 VITALS — BODY MASS INDEX: 26.03 KG/M2 | HEIGHT: 66 IN | WEIGHT: 162 LBS

## 2021-05-31 VITALS — WEIGHT: 160 LBS | BODY MASS INDEX: 23.63 KG/M2

## 2021-05-31 VITALS — WEIGHT: 149 LBS | BODY MASS INDEX: 24.05 KG/M2

## 2021-05-31 VITALS — WEIGHT: 160 LBS | BODY MASS INDEX: 25.82 KG/M2

## 2021-05-31 VITALS — HEIGHT: 66 IN | BODY MASS INDEX: 24.91 KG/M2 | WEIGHT: 155 LBS

## 2021-05-31 VITALS — WEIGHT: 155 LBS | HEIGHT: 66 IN | BODY MASS INDEX: 24.91 KG/M2

## 2021-05-31 VITALS — BODY MASS INDEX: 28.12 KG/M2 | WEIGHT: 175 LBS | HEIGHT: 66 IN

## 2021-05-31 NOTE — TELEPHONE ENCOUNTER
Rj for oxycodone 5 mg tablets number 24 tablets take 1 tablet every 6 hours.  As needed for pain.  No refills.  Please call patient and pharmacy.  Or set up for me to rj for Dr. Valladares's patient.

## 2021-05-31 NOTE — TELEPHONE ENCOUNTER
Controlled Substance Refill Request  Medication:   Requested Prescriptions     Pending Prescriptions Disp Refills     oxyCODONE (ROXICODONE) 5 MG immediate release tablet [Pharmacy Med Name: OXYCODONE TAB 5MG] 1 tablet 0     Sig: TAKE 1 TABLET BY MOUTH EVERY 6 HOURS AS NEEDED DX PAIN     Date Last Fill: 8/28/19 was set to print  Pharmacy: [facility]   Submit electronically to pharmacy  Controlled Substance Agreement on File:   Encounter-Level CSA Scan Date:    There are no encounter-level csa scan date.       Last office visit: Last office visit pertaining to requested medication was 7/24/19.

## 2021-05-31 NOTE — TELEPHONE ENCOUNTER
Medication Question or Clarification  Who is calling: Patient's daughter  What medication are you calling about? (include dose and sig)   oxyCODONE (ROXICODONE) 5 MG immediate release tablet 30 tablet 0 7/26/2019     Sig: TAKE 1 TABLET BY MOUTH EVERY 4 HOURS AS NEEDED DX PAIN    Class: Print    Earliest Fill Date: 7/26/2019        Who prescribed the medication?: Dr Martines  What is your question/concern?: Daughter would like to change sig to 1 tablet every 6 hours as needed for pain.  Pharmacy: [facility].  Okay to leave a detailed message?: Yes  Site CMT - Please call the pharmacy to obtain any additional needed information.

## 2021-05-31 NOTE — TELEPHONE ENCOUNTER
"RN Briseida from [facility] living is reporting patient's fall @ 5:30pm today. He slid out of his recliner, No injury noted. \"He was found sitting on his butt\". Form will be faxed to [facility].     Mansi Wang RN [facility] Triage Nurse.   "

## 2021-05-31 NOTE — PROGRESS NOTES
MTM Initial Encounter  Assessment & Plan                                                     Back Pain: Patient to continue oxycodone and acetaminophen schedule.  Would recommend continuing acetaminophen to lower pain overall.  Can consider switching to liquids in the future.  Will confirm with senior living whether he is getting lidocaine.  We will also confirm whether they are giving him docusate, and we can switch that to liquids.  **Spoke to nursing home, he is getting docusate two times a day. Will switch to liquid. Nursing home said patient is refusing lidocaine. Spoke to daughter and he should be getting it regardless of him refusing. Order sent for lidocaine 4% patch as well.     Osteoporosis: Patient to continue Prolia and recommend follow-up DEXA scan October 2019.  Will continue calcium and vitamin D supplements right now, but can consider switching to vitamin D drops and chewable calcium in the future if needed.  Last vitamin D level within normal limits.  Recommending asking facility to allow patient to drink milk twice daily as he used to in the past.    CAD: Stable, appears to have no recent chest pain.  Recommended following up with [facility] cardiology regarding dual antiplatelet regimen.  Patient is on moderate intensity statin, but due to age and comorbidities would recommend continuing.  Clopidogrel tablets can be crushed.  Can consider switching aspirin to chewable in the future.  Okay to crush simvastatin.    Hypertension: Blood pressure well controlled, slightly low if anything.  Recommended following up with cardiology.     Depression/Anxiety/Dementia: Stable.  Escitalopram has a film coating, therefore would rather have him swallow at this time.  Can consider switching to liquid formulation in the future if needed.    GERD: Stable, will recommend continuing omeprazole due to acid reflux history.  Contents of capsule can be mixed in a small amount of applesauce, will review this with senior  living facility.  **Sent new order with directions to open capsule    Supplements: Recommended continuing multivitamin due to appetite.  Okay to continue melatonin.  To decrease pill burden will discontinue co-Q10, especially since he is taking it 3 times daily.  Patient is on a statin, but improvement in myalgias is controversial and likely okay to discontinue at this time.  Plan:  1.  Discontinue CoQ10 (order sent)    Follow Up  Will talk to daughter after speaking with assisted living.  Follow-up will likely be with daughter depending on when she next sees her father.    Subjective & Objective                                                     Manjeet Oshea is a 84 y.o. male called for an initial visit for Medication Therapy Management. He was referred to me from daughter.  Spoke to daughter Gracie on the phone (consent to communicate), who lives in [place].    Chief Complaint: Would like to go over to see what is medically necessary.     Medication Adherence/Access: Lives in assisted living facility. Daughter says no communication between the staff-- some will have him swallow but sometimes they crush.  Thinks unable to swallow because his mouth is very dry  and sticking.  Wondering if it is okay to crush medications.  Would consider liquid formulations.    Back Pain: [facility] with [facility] on 7/30/19. Underegoing PT. currently taking APAP 1000 mg 3 times daily and oxycodone  5 mg every 6 hours.  Is getting oxycodone on a schedule which has been helpful.  She is not sure if Tylenol is doing anything.  Is unsure if he has lidocaine 5% ointment.  Prescription patch was not covered, but she bought patches from [employer] but she is not sure if they are applying it on him.  Reports that oxycodone is helpful, but she feels it dulls his senses.  Docusate 100 mg twice daily + PEG PRN.  Reports that docusate should be scheduled.  Does not think they are giving him MiraLAX.  Yesterday stools are soft and there was some  blood she reports.    Osteoporosis: Currently taking Prolia, calcium citrate daily and Vitamin D 2000 IU daily.  Patient has compression fractures. Last Prolia on 5/2/19 (3rd injection). Last DEXA scan on 10/11/17 showed a t-score of -3.5. Dietary calcium intake: maybe some cheese in food, no milk there but he used to drink skim milk twice a day but they do not give it to him there. No yogurt. Bought some ensure but he didn't like it.    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 Taking simvastatin 40 mg daily, aspirin 81 mg daily and clopidogrel 75 mg daily. Last saw [facility] 9/27/17.  She is planning on scheduling a future appointment.  Denies significant bleeding except found some blood in stools yesterday per her report.  Denies recent chest pain.  Last lipids checked 2015    Hypertension: Currently taking metoprolol succinate 50 mg daily. Patient does monitor BP at home. Home BP always low.    Depression/Anxiety/Dementia: Currently taking escitalopram 20 mg daily. Started by Dr. Bonilla on 1/4/18. Improved since starting, was withdrawn and irritable. Happy darian.     GERD: Currently taking omeprazole 20 mg daily.  Reports a history of acid reflux.  Daughter reports that he was groaning all weekend and she thinks it was due to constipation but it could be from GERD.    Supplements: Currently taking CoQ 10 three times a day, multivitamin, and melatonin 10 mg HS.   Sleeps ok, but wasn't for a while.  She is not sure why he is taking co-Q10.      PMH: reviewed in EPIC   Allergies/ADRs: reviewed in EPIC   Alcohol: reviewed in EPIC  Tobacco:   Social History     Tobacco Use   Smoking Status Former Smoker   Smokeless Tobacco Never Used     Today's Vitals: There were no vitals filed for this visit.  ----------------    Much or all of the text in this note was generated through the  use of Dragon Dictate voice-to-text software. Errors in spelling or words which seem out of context are unintentional. Sound alike errors, in particular, may have escaped editing.    The patient declined an after visit summary    I spent 40 minutes with this patient today.   All changes were made via collaborative practice agreement with Colby Martines MD. A copy of the visit note was provided to the patient's provider.     Sarmad GonzalezD., BCACP  Medication Therapy Management Pharmacist  [facility]     Current Outpatient Medications   Medication Sig Dispense Refill     acetaminophen (TYLENOL) 500 MG tablet Take 2 tablets (1,000 mg total) by mouth 3 (three) times a day.  0     aspirin 81 MG EC tablet Take 81 mg by mouth daily.       calcium citrate-vitamin D (CITRACAL+D) 315-200 mg-unit per tablet Take 1 tablet by mouth daily.       cholecalciferol, vitamin D3, 1,000 unit tablet Take 2,000 Units by mouth daily.        clopidogrel (PLAVIX) 75 mg tablet Take 1 tablet by mouth  daily 90 tablet 3     co-enzyme Q-10 30 mg capsule Take 30 mg by mouth 3 (three) times a day.       docusate sodium (COLACE) 100 MG capsule Take 1 capsule (100 mg total) by mouth 2 (two) times a day. 100 capsule 3     escitalopram oxalate (LEXAPRO) 20 MG tablet Take 20 mg by mouth at bedtime.       lidocaine (XYLOCAINE) 5 % ointment Apply two times a day prn for pain to the affected area. 35.44 g 2     melatonin 10 mg TbER Take 1 tablet by mouth at bedtime. 90 tablet 3     metoprolol succinate (TOPROL-XL) 50 MG 24 hr tablet Take 1 tablet (50 mg total) by mouth daily. 30 tablet 0     multivitamin with minerals (THERA-M) 9 mg iron-400 mcg Tab tablet Take 1 tablet by mouth daily.       omeprazole (PRILOSEC) 20 MG capsule Take 1 capsule by mouth  daily 90 capsule 3     oxyCODONE (ROXICODONE) 5 MG immediate release tablet Take 1 tablet (5 mg total) by mouth every 4 (four) hours as needed for pain. 30 tablet 0      oxyCODONE (ROXICODONE) 5 MG immediate release tablet TAKE 1 TABLET BY MOUTH EVERY 4 HOURS AS NEEDED FOR PAIN 30 tablet 0     oxyCODONE (ROXICODONE) 5 MG immediate release tablet Take 1 tablet (5 mg total) by mouth every 6 (six) hours as needed for pain. 24 tablet 0     oxyCODONE (ROXICODONE) 5 MG immediate release tablet TAKE 1 TABLET BY MOUTH EVERY 6 HOURS AS NEEDED DX PAIN 1 tablet 0     polyethylene glycol (MIRALAX) 17 gram packet Take 1 packet (17 g total) by mouth daily as needed.  0     simvastatin (ZOCOR) 40 MG tablet Take 40 mg by mouth bedtime.       Current Facility-Administered Medications   Medication Dose Route Frequency Provider Last Rate Last Dose     denosumab 60 mg (PROLIA 60 mg/ml)  60 mg Subcutaneous Q6 Months Jerod Epps MD   60 mg at 05/02/19 1544

## 2021-05-31 NOTE — TELEPHONE ENCOUNTER
I am actually fairly certain that we have refill this medication.  However, if someone else is involved in this we need to resolve this and decide who is prescribing the oxycodone.  He does have severe back pain secondary to fractures and falling.  If I house physician at his facility is handling this I am fine with that but it needs to be perfectly clear that only one person should be prescribing medication.

## 2021-05-31 NOTE — TELEPHONE ENCOUNTER
Orders have been placed in your in box to review.  Jayashree MOHAN, ASHLEE/CMT....................8:53 AM

## 2021-05-31 NOTE — TELEPHONE ENCOUNTER
Who is calling:  Patients daughter Gracie Oshea  Reason for Call:  Caller states that she needs to get health plan for Medicaid for patient , the options she have is [facility], [facility] ,[facility] . Caller is asking which one   approved. Date of last appointment with primary care: 7/24/19  Okay to leave a detailed message: No

## 2021-05-31 NOTE — TELEPHONE ENCOUNTER
Controlled Substance Refill Request  Medication:   Requested Prescriptions     Pending Prescriptions Disp Refills     oxyCODONE (ROXICODONE) 5 MG immediate release tablet [Pharmacy Med Name: OXYCODONE TAB 5MG] 1 tablet 0     Sig: TAKE 1 TABLET BY MOUTH EVERY 6 HOURS AS NEEDED DX PAIN     Date Last Fill: 8/7/19  Pharmacy: [facility]   Submit electronically to pharmacy  Controlled Substance Agreement on File:   Encounter-Level CSA Scan Date:    There are no encounter-level csa scan date.       Last office visit: Last office visit pertaining to requested medication was 7/24/19.

## 2021-05-31 NOTE — TELEPHONE ENCOUNTER
Dr. Martines,  It does look like you have refilled it for the patient the past two times.  Refill has been set up for you to review.  Jayashree MOHAN, ASHLEE/CMT....................10:58 AM

## 2021-05-31 NOTE — TELEPHONE ENCOUNTER
Referral Request  Type of referral: MTM PharmD  Who s requesting: Daughter  Why the request: Wants to go over medication list to see what medication is necessary.  Have you been seen for this request: N/A  Does patient have a preference on a group/provider? [facility]  Okay to leave a detailed message?  Yes

## 2021-05-31 NOTE — TELEPHONE ENCOUNTER
Prescriptions placed in mail to pharmacy. Prescriptions were sent up to be sent electronically and defaulted back to print once provider signed.     Kym Duffy, CMA

## 2021-05-31 NOTE — TELEPHONE ENCOUNTER
Who is calling:  [facility] [place] - [address] [place] [address]  Reason for Call:  Caller is requesting to mail original copy of prescription to pharmacy Address [address] [place] [address] , [place] [address]. Please call pharmacy and notify before mailing. Phone # [phone]  Date of last appointment with primary care: unknown  Okay to leave a detailed message: No

## 2021-05-31 NOTE — TELEPHONE ENCOUNTER
Please advise of ok to change directions and send to you for approval? Thank you.    Kym Duffy, CMA

## 2021-05-31 NOTE — TELEPHONE ENCOUNTER
Okay for Colace 100 mg tablets number 100 tablets take 1 tablet twice daily.  With 3 refills.  Please call patient and pharmacy.

## 2021-05-31 NOTE — TELEPHONE ENCOUNTER
Medication Request  Medication name: Senna or Colace  Pharmacy Name and Location: [facility]  Reason for request: On narcotic pain medication.    Okay to leave a detailed message: yes

## 2021-05-31 NOTE — TELEPHONE ENCOUNTER
Dr. Chung,  Would you be willing to review message below and advise.  Thank you.  Jayashree MOHAN, CMA/CMT....................1:22 PM

## 2021-05-31 NOTE — TELEPHONE ENCOUNTER
Dr. Martines,  This medication has never been prescribed or filled by you.  Last refill was by Dr. Bandar Calero for #30-5 day supply.  Please advise.    Fill Date ID Written Drug Qty Days Prescriber Rx # Pharmacy Refill Daily Dose * Pymt Type    07/25/2019 1 07/24/2019 Oxycodone Hcl 5 Mg Tablet 30.00 5 Ki Aad [id number] Tot (8035) 1/1 45.00 [facility] [place]  07/22/2019 1 07/22/2019 Oxycodone Hcl 5 Mg Tablet 21.00 3 Ki Aad [id number] Tot (8035) 1/1 52.50 [facility] [place]   07/09/2019 1 07/09/2019 Oxycodone Hcl 5 Mg Tablet 20.00 5 Ma Rol [id number] Tot (8035) 1/1 30.00 [place]    Prescription Monitoring Program activity reviewed WITH discrepancies noted.  See copied image from [facility] below.    Controlled Substance Agreement: No  Date:     Please advise.    Jayashree MOHAN CMA/SURENDRA....................10:10 AM

## 2021-05-31 NOTE — TELEPHONE ENCOUNTER
Orders being requested:  Physical therapy orders to continue treatment, 2 times a week for 2 weeks, 2 times a week for one week.  Reason service is needed/diagnosis: Trying to establish walking program due to previous falls.  When are orders needed by:  As soon as able  Where to send Orders:  Phone: [phone]  Okay to leave detailed message?  Yes

## 2021-05-31 NOTE — TELEPHONE ENCOUNTER
Controlled Substance Refill Request  Medication:   Requested Prescriptions     Pending Prescriptions Disp Refills     oxyCODONE (ROXICODONE) 5 MG immediate release tablet [Pharmacy Med Name: OXYCODONE TAB 5MG] 30 tablet 0     Sig: TAKE 1 TABLET BY MOUTH EVERY 4 HOURS AS NEEDED FOR PAIN     Date Last Fill: 8/26/19 was set to print  Pharmacy: [facility]   Submit electronically to pharmacy  Controlled Substance Agreement on File:   Encounter-Level CSA Scan Date:    There are no encounter-level csa scan date.       Last office visit: Last office visit pertaining to requested medication was 7/24/19

## 2021-05-31 NOTE — TELEPHONE ENCOUNTER
Called them and notified- sounds like the confusion was if it was as needed or not. Advised currently PRN and can continue that.

## 2021-05-31 NOTE — TELEPHONE ENCOUNTER
Medication Question or Clarification  Who is calling: Pharmacy: [facility]  What medication are you calling about? (include dose and sig)   oxyCODONE (ROXICODONE) 5 MG immediate release tablet 24 tablet 0 8/26/2019     Sig - Route: Take 1 tablet (5 mg total) by mouth every 6 (six) hours as needed for pain. - Oral    Class: Print    Earliest Fill Date: 8/26/2019        Who prescribed the medication?: Dr Shoemaker  What is your question/concern?: Pharmacy states needs original copy of Rx mailed to pharmacy or can E scribe to pharmacy.  [facility]: [facility]  Okay to leave a detailed message?: Yes  Site CMT - Please call the pharmacy to obtain any additional needed information.

## 2021-05-31 NOTE — TELEPHONE ENCOUNTER
Prescription Monitoring Program activity reviewed with no discrepancies noted.      Last fill per  was 8/15/19 for 30 tablets, which is a 5 day supply.     Previous scripts in computer have been destroyed and not filled.     Sagrario Davila

## 2021-05-31 NOTE — TELEPHONE ENCOUNTER
Medication Question or Clarification  Who is calling: Other: Ross at [facility]  What medication are you calling about? (include dose and sig) oxycodone 5 mg every 6 hours as needed for pain  Who prescribed the medication?: Dr Shoemaker   What is your question/concern?: Ross is asking if they are suppose to discontinue the previous order of 5 mg four times a day. If so, please send an order for that.  Pharmacy: N/A  Okay to leave a detailed message?: No  Site CMT - Please call the pharmacy to obtain any additional needed information.

## 2021-05-31 NOTE — TELEPHONE ENCOUNTER
Daughter (Gracie) is calling that pt is in severe pain, however due to his dementia he does verbalize the need for a pain med.  Oxycodone is ordered every 4 hrs as needed.  By not getting this med he is in severe pain.    Daughter asking if Oxycodone can be scheduled every 4 hrs until pt can be seen at the [facility] which is being scheduled.    Please advise.  Daughter can be reached at [phone].    Thank you  Gabi Martin RN, MA  [facility] RN Triage Nurse Advisor

## 2021-05-31 NOTE — TELEPHONE ENCOUNTER
Controlled Substance Refill Request  Medication Name:   Requested Prescriptions     Pending Prescriptions Disp Refills     oxyCODONE (ROXICODONE) 5 MG immediate release tablet 30 tablet 0     Date Last Fill: 7/26/19  [facility]      Submit electronically to pharmacy  Controlled Substance Agreement Date Scanned:   Encounter-Level CSA Scan Date:    There are no encounter-level csa scan date.       Last office visit with prescriber/PCP: 7/24/2019 Colby Martines MD OR same dept: 7/24/2019 Colby Martines MD OR same specialty: 7/24/2019 Colby Martines MD  Last physical: 8/14/2015 Last MTM visit: Visit date not found

## 2021-05-31 NOTE — TELEPHONE ENCOUNTER
Called and spoke with pharmacy. Advised script was mailed to them today and to cancel any previous Rx.     Sagrario Davila LPN

## 2021-05-31 NOTE — TELEPHONE ENCOUNTER
Medication Question or Clarification  Who is calling: Pharmacy: Stephanie - [facility]   What medication are you calling about?   oxycodine 5 mg   Who prescribed the medication?: Dr. Martines  What is your question/concern?: Stephanie would like clarification on whether or not patient would be taking this every 4 hr as needed during the week, or if would be needed every 4 hrs as needed in general. Could you call Stephanie to clarify the directions on this as soon as able.  Pharmacy: [facility] - [facility] -[phone]  Okay to leave a detailed message?: Yes  Site CMT - Please call the pharmacy to obtain any additional needed information.

## 2021-05-31 NOTE — TELEPHONE ENCOUNTER
oxyCODONE (ROXICODONE) 5 MG immediate release tablet 30 tablet 0 8/7/2019     Sig - Route: Take 1 tablet (5 mg total) by mouth every 4 (four) hours as needed for pain. - Oral       Stephanie would like clarification on whether or not patient would be taking this every 4 hr as needed during the week, or if would be needed every 4 hrs as needed in general. Could you call Stephanie to clarify the directions on this as soon as able.

## 2021-05-31 NOTE — TELEPHONE ENCOUNTER
Every time we sent this prescription they seem to have recurring questions.  This is not that difficult.  The directions for the medication are 5 mg 1 tablet every 4 hours regularly while awake.  That means 7 days/week.  It also means that if the patient is asleep they should not wake him up to give him the medication.  I do not think that I can explain this any more clearly.

## 2021-05-31 NOTE — TELEPHONE ENCOUNTER
Spoke with Stephanie at [facility] and relayed message below from .  She verbalized understanding and had no further questions at this time.  Jayashree MOHAN CMA/SURENDRA....................3:08 PM

## 2021-05-31 NOTE — TELEPHONE ENCOUNTER
Controlled Substance Refill Request  Medication:   Requested Prescriptions     Pending Prescriptions Disp Refills     oxyCODONE (ROXICODONE) 5 MG immediate release tablet [Pharmacy Med Name: OXYCODONE TAB 5MG] 30 tablet 0     Sig: TAKE 1 TABLET BY MOUTH EVERY 4 HOURS AS NEEDED FOR PAIN     Date Last Fill: 8/7/19- class print  Pharmacy: [facility]   Submit electronically to pharmacy  Controlled Substance Agreement on File:   Encounter-Level CSA Scan Date:    There are no encounter-level csa scan date.       Last office visit: 7/24/2019 Colby Martines MD Leslie White, ABILIO BSBA [facility] Triage/Med Refill 8/14/2019 1:28 PM

## 2021-05-31 NOTE — TELEPHONE ENCOUNTER
Daughter notified of below orders. Prescriptions set up in a separate refill encounter to be signed. Written orders given to Dr Shoemaker to sign and fax back to nursing facility. Fax [phone].    Kym Duffy, CMA

## 2021-05-31 NOTE — TELEPHONE ENCOUNTER
Spoke with daughter and relayed below orders. Order written to go to nursing facility fax [phone]. Prescription set up to be signed and sent to pharmacy.    Kym Duffy, CMA

## 2021-05-31 NOTE — TELEPHONE ENCOUNTER
Prescription Monitoring Program activity reviewed with no discrepancies noted.      Last fill per : 8/7  Quantity/days supply: 30/5    Controlled Substance Agreement on file: No  Date:     Last office visit with provider:  7/24/2019 Colby Martines MD    Please advise.  Marily Strauss CMA ([facility])

## 2021-06-01 ENCOUNTER — RECORDS - HEALTHEAST (OUTPATIENT)
Dept: ADMINISTRATIVE | Facility: CLINIC | Age: 86
End: 2021-06-01

## 2021-06-01 VITALS — WEIGHT: 172 LBS | BODY MASS INDEX: 25.4 KG/M2

## 2021-06-01 VITALS — WEIGHT: 169.9 LBS | BODY MASS INDEX: 25.17 KG/M2 | HEIGHT: 69 IN

## 2021-06-01 VITALS — HEIGHT: 69 IN | BODY MASS INDEX: 25.62 KG/M2 | WEIGHT: 173 LBS

## 2021-06-01 VITALS — WEIGHT: 166.5 LBS | BODY MASS INDEX: 24.59 KG/M2

## 2021-06-01 NOTE — TELEPHONE ENCOUNTER
Gloria Lopez from  Hospice is calling because the patient is being discharged today (9/5/19) from [facility].  The patient has signed onto Hospice care and they are wondering if Dr. Martines will continue being the patient's attending and sign the death certificate of terminal illness when it comes time for it.  A call back within 24 hours is needed and the phone number given is secure for voice messages.

## 2021-06-01 NOTE — PROGRESS NOTES
[facility] Admission note      Patient: Manjeet Oshea  MRN: [id number]  Date of Service: 9/9/2019      [facility] [[id number]]  Reason for Visit     Chief Complaint   Patient presents with     H & P       Code Status     DNR    Assessment     -Acute toxic encephalopathy  -History of GI bleed  -History of esophageal stricture  -History of chronic urinary retention  -End-stage dementia /profound confusion noted on exam today  -New onset atrial fibrillation  -History of multiple TIAs  - L2 compression fractures    Plan     Patient was admitted to long-term care.  He was admitted in the hospital for GI bleed concerns.  Family and GI elected not to pursue aggressive intervention including EGD.  He has been discharged on Prilosec 20 mg twice a day from once a day.  Plavix has been discontinued because of GI bleed.  He remains on low-dose aspirin and no warfarin was considered for his atrial fibrillation.  He has a history of L2 vertebral compression fracture.  He was taken off oxycodone as it was felt that his compression fracture has resolved and narcotics may be contributing to his encephalopathy   And decreased appetite   he is not reporting any significant amount of pain.  He is on a low-dose of metoprolol 37.5 mg because of low blood pressures  Flomax was started due to concerns of urinary retention.  He was given a voiding trial in the nursing home.  And recheck UA appeared to be dirty and he has been started on Cipro.  He denies any dysuria and his recheck urine cultures did not show any specific organism so his ciprofloxacin will be discontinued.  Medication review was done he will be taken off statin vitamin D and calcium.  He will be on comfort care package.  Patient remains profoundly confused and disoriented not alert and oriented to his surroundings at all    History     Patient is a very pleasant 84 y.o. male who is admitted to TCU  Patient has significant dementia but  presented with increasing confusion.  He was admitted in the hospital and noted to have acute metabolic encephalopathy in the setting of chronic dementia.  This was felt to be multifactorial secondary to oxycodone use which is scheduled for him in addition related to GI bleed.  He was admitted and his hemoglobin dropped to 8.8 from an admission hemoglobin of 11.2 initially thought to be dilutional.  However he had guaiac positive stools.  It was suspected he was having acute GI bleed.  No work-up was initiated in light of his profound dementia he has been put on PPIs twice daily aspirin and Plavix have been discontinued.  Patient also was noted to have new onset atrial fibrillation.  Cardiology saw him they recommended medical management in light of his moderate to severe dementia and numerous comorbidities warfarin was not recommended  Aspirin was recommended for management of his coronary artery disease TIAs and A. Fib.  She has a recent history of multiple compression fractures and was on oxycodone but this has been discontinued in light of his confusion.  Patient was felt to be failure to thrive with poor intake.  It was recommended that he consider hospice he has elected to move to long-term care on hospice cares  He has been taken off multiple supplementations as well as his oxycodone for pain management.  He is no longer a candidate for any blood thinners in light of his GI bleed.  He remains profoundly confused and not alert and oriented to his surroundings    Past Medical History     Active Ambulatory (Non-Hospital) Problems    Diagnosis     Paroxysmal atrial fibrillation (H)     Persistent atrial fibrillation (H)     Urinary retention     Encounter for palliative care     Hospice care patient     Pain     Generalized muscle weakness     GI bleed     Metabolic encephalopathy     RLQ abdominal pain     Chronic systolic heart failure (H)     Dementia     Coronary artery disease involving native coronary  artery of native heart without angina pectoris     Dyslipidemia     Urinary Frequency More Than Twice At Night ([place]cturia)     Hypercholesterolemia     Esophageal Reflux     Osteoporosis     Chronic Sinusitis     Limb Pain     Past Medical History:   Diagnosis Date     Asthma      Chronic back pain      Chronic sinusitis      Compression fracture of body of thoracic vertebra (H) 7/7/2019     Coronary artery disease      Dementia      Dyslipidemia      GERD (gastroesophageal reflux disease)      Hemorrhoids      HTN (hypertension)      Hyperlipidemia      Osteoporosis      Paroxysmal atrial fibrillation (H) 9/4/2019     TIA (transient ischemic attack)      Urinary retention 9/3/2019       Past Social History     Reviewed, and he  reports that he has quit smoking. He has never used smokeless tobacco. He reports that he does not drink alcohol or use drugs.    Family History     Reviewed, and his daughter has cerebral palsy    Medication List     Current Outpatient Medications on File Prior to Visit   Medication Sig Dispense Refill     acetaminophen (TYLENOL) 500 MG tablet Take 2 tablets (1,000 mg total) by mouth 3 (three) times a day. (Patient taking differently: Take 1,000 mg by mouth 3 (three) times a day.       )  0     aspirin 81 MG EC tablet Take 81 mg by mouth daily.       docusate sodium (COLACE) 50 mg/5 mL oral liquid Take 10 mL (100 mg total) by mouth 2 (two) times a day. 60 mL 2     escitalopram oxalate (LEXAPRO) 20 MG tablet Take 20 mg by mouth at bedtime.       lidocaine 4 % patch Place 1 patch on the skin daily. Place on back. Remove and discard patch with 12 hours. (Patient taking differently: Place 1 patch on the skin daily. Place on back in morning and remove at bedtime. On 12 hours, off for 12 hours      ) 30 patch 0     melatonin 10 mg TbER Take 1 tablet by mouth at bedtime. 90 tablet 3     metoprolol succinate (TOPROL-XL) 25 MG Take 1.5 tablets (37.5 mg total) by mouth daily.       multivitamin with  minerals (THERA-M) 9 mg iron-400 mcg Tab tablet Take 1 tablet by mouth daily.       omeprazole (PRILOSEC) 20 MG capsule Take 1 capsule (20 mg total) by mouth 2 (two) times a day before meals. Open contents of capsule and sprinkle into room temp applesauce. 90 capsule 3     polyethylene glycol (MIRALAX) 17 gram packet Take 1 packet (17 g total) by mouth daily as needed.  0     tamsulosin (FLOMAX) 0.4 mg cap Take 1 capsule (0.4 mg total) by mouth every morning.  0     No current facility-administered medications on file prior to visit.    D/C CIPRO    Allergies     No Known Allergies    Review of Systems   A comprehensive review of 14 systems was done. Pertinent findings noted here and in history of present illness. All the rest negative.  Constitutional: Negative.  Negative for fever, chills, he has activity change, appetite change and fatigue.   HENT: Negative for congestion and facial swelling.    Eyes: Negative for photophobia, redness and visual disturbance.   Respiratory: Negative for cough and chest tightness.    Cardiovascular: Negative for chest pain, palpitations and leg swelling.   Gastrointestinal: Negative for nausea, diarrhea, constipation, blood in stool and abdominal distention.   Genitourinary: Negative.    Musculoskeletal: Negative.  Very weak and wheelchair-bound  Skin: Negative.    Neurological: Negative for dizziness, tremors, syncope, weakness, light-headedness and headaches.   Hematological: Does not bruise/bleed easily.   Psychiatric/Behavioral: Negative.  Extremely confused recall is very limited      Physical Exam     Recent Vitals 9/6/2019   Height -   Weight 141 lbs   BSA (m2) 1.79 m2   /70   Pulse 90   Temp 98   Temp src -   SpO2 98   Some recent data might be hidden       Constitutional: Oriented to person, and appears well-developed.   HEENT:  Normocephalic and atraumatic.  Eyes: Conjunctivae and EOM are normal. Pupils are equal, round, and reactive to light. No discharge.  No  scleral icterus. Nose normal. Mouth/Throat: Oropharynx is clear and moist. No oropharyngeal exudate.    NECK: Normal range of motion. Neck supple. No JVD present. No tracheal deviation present. No thyromegaly present.   CARDIOVASCULAR: Normal rate, regular rhythm and intact distal pulses.  Exam reveals no gallop and no friction rub.  Systolic murmur present.  PULMONARY: Effort normal and breath sounds normal. No respiratory distress.No Wheezing or rales.  ABDOMEN: Soft. Bowel sounds are normal. No distension and no mass.  There is no tenderness. There is no rebound and no guarding. No HSM.  MUSCULOSKELETAL: Normal range of motion. No edema and no tenderness. Mild kyphosis, no tenderness.  LYMPH NODES: Has no cervical, supraclavicular, axillary and groin adenopathy.   NEUROLOGICAL: Alert and oriented to person, . No cranial nerve deficit.  Normal muscle tone. Coordination normal.   GENITOURINARY: Deferred exam.  SKIN: Skin is warm and dry. No rash noted. No erythema. No pallor.   EXTREMITIES: No cyanosis, no clubbing, no edema. No Deformity.  PSYCHIATRIC: Normal mood, affect and behavior.  Recall is profoundly impaired he is not alert and oriented to surroundings at all      Lab Results       Lab Results   Component Value Date    ALBUMIN 2.5 (L) 09/02/2019    ALT 25 09/02/2019    AST 29 09/02/2019    BUN 13 09/07/2019    CALCIUM 8.7 09/07/2019    CL 95 (L) 09/07/2019    CHOL 150 08/14/2015    CO2 29 09/07/2019    CREATININE 0.70 09/07/2019    GFRAA >60 09/07/2019    GFRNONAA >60 09/07/2019    GLU 91 09/07/2019    HCT 31.5 (L) 09/07/2019    WBC 11.8 (H) 09/07/2019    HGB 10.4 (L) 09/07/2019    MG 3.0 (H) 09/02/2019     09/07/2019    K 3.6 09/07/2019    PSA 0.5 09/19/2014     (L) 09/07/2019           Imaging Results     Xr Chest 2 Views    Result Date: 9/1/2019  EXAM: XR CHEST 2 VIEWS LOCATION: [facility] DATE/TIME: 9/1/2019 3:57 PM INDICATION: Altered mental status. COMPARISON: CT chest 7/7/2019.  FINDINGS: Previous sternotomy for coronary artery bypass. Heart size and vascularity are normal. Large retrocardiac hiatal hernia. Shallow inspiration. Bibasilar fibroatelectasis with no acute infiltrate, pneumothorax nor pleural effusion. Generalized demineralization with old pole lower thoracic and upper lumbar compression deformities    Xr Humerus Right 2 Vws    Result Date: 9/1/2019  EXAM: XR HUMERUS RIGHT 2 VWS LOCATION: [facility] DATE/TIME: 9/1/2019 3:58 PM INDICATION: Status post fall with right arm pain. COMPARISON: None. FINDINGS: No evidence of fracture. Indeterminate irregular soft tissue calcifications posterior lateral aspect of the mid humeral diaphysis of uncertain etiology. Is there palpable mass in this location?    Ct Head Without Contrast    Result Date: 9/1/2019  EXAM: CT HEAD WO CONTRAST LOCATION: [facility] DATE/TIME: 9/1/2019 3:36 PM INDICATION: Confusion and altered mental status COMPARISON: CT head dated 7/7/2019 TECHNIQUE: CT head without IV contrast. Multiplanar reformats. Dose reduction techniques were used. FINDINGS: INTRACRANIAL CONTENTS: No acute intracranial hemorrhage. Scattered periventricular and deep cortical white matter areas of low attenuation, likely representing sequelae of chronic microangiopathy. No acute loss of gray-white matter differentiation to suggest large territorial infarction. Mild cerebral volume loss with appropriate size and morphology of the ventricular system without shift of the midline structures. No extra-axial fluid collections.  Patent basal cisterns. SINUSES/MASTOIDS/ORBITS:The visualized paranasal sinuses and temporal bone structures are well-aerated. Postoperative changes of bilateral lenses, otherwise the orbits are unremarkable. BONES/SOFT TISSUES: The calvarium and skull base are unremarkable. Intracranial vascular calcifications.     Senescent changes and sequelae of chronic microangiopathy without acute intracranial abnormality.  If there is continued clinical concern for acute intracranial pathology MRI is a more sensitive evaluation.     Ct Abdomen Pelvis Without Oral With Iv Contrast    Result Date: 9/1/2019  EXAM: CT ABDOMEN PELVIS WO ORAL W IV CONTRAST LOCATION: [facility] DATE/TIME: 9/1/2019 6:12 PM INDICATION: LLQ abdominal pain, diverticulitis suspected abdominal pain COMPARISON: CT 7/7/2019 TECHNIQUE: Helical enhanced thin-section CT scan of the abdomen and pelvis was performed following injection of IV contrast. Multiplanar reformats were obtained. Dose reduction techniques were used. CONTRAST: Iohexol (Omni) 75mL FINDINGS: LUNG BASES: Large sliding hiatal hernia. ABDOMEN: Normal-appearing liver, gallbladder, adrenals and spleen. Mild bilateral hydronephrosis. Patent mesenteric vessels. Retroaortic left renal vein. PELVIS: Large volume stool in colon compatible with impaction. Mild diffuse rectal wall thickening without mesenteric edema. Distended bladder. Small bowel appears normal. No ascites. MUSCULOSKELETAL: Multiple stable thoracic and lumbar compression fractures. Bilateral nondisplaced sacral insufficiency fractures. Prior sternotomy.     CONCLUSION: 1.  Fecal impaction. 2.  Urinary retention. 3.  Bilateral sacral nondisplaced insufficiency fractures. 4.  Hiatal hernia.     Us Venous Arm Right    Result Date: 9/1/2019  EXAM: US VENOUS ARM RIGHT LOCATION: [facility] DATE/TIME: 9/1/2019 4:21 PM INDICATION: mass/firmness COMPARISON: None. TECHNIQUE: Duplex exam performed utilizing 2D gray-scale imaging, Doppler interrogation with color-flow and spectral waveform analysis. Exam performed without and with compression when possible. FINDINGS: Ultrasound includes evaluation of the internal jugular veins, innominate veins, subclavian veins, axillary veins, and brachial veins. The superficial cephalic and basilic veins were also evaluated where seen. RIGHT ARM VEINS: Negative for DVT. Imaging performed over the area  right upper arm firmness. No soft tissue abnormality seen.     CONCLUSION: 1.  The right arm veins are negative for DVT.      Post Discharge Medication Reconciliation Status: discharge medications reconciled and changed, per note/orders (see AVS)      JADYN Jeffrey

## 2021-06-01 NOTE — TELEPHONE ENCOUNTER
Spoke with Dr. Sheldon Zabala's assistant, who states that the prescription was mailed on 8/30/19.    Called and Spoke with Cody, the pharmacist, and let him know that the prescription was mailed out on 8/30/19.  He verbalized understanding and had no further questions at this time.  Jayashree MOHAN CMA/SURENDRA....................11:01 AM

## 2021-06-01 NOTE — TELEPHONE ENCOUNTER
Medication Question or Clarification  Who is calling: Pharmacy: Cody Pharmacist from [facility]  What medication are you calling about? (include dose and sig)   oxyCODONE (ROXICODONE) 5 MG immediate release tablet (Discontinued) 30 tablet 0 8/30/2019 9/1/2019 No   Sig: TAKE 1 TABLET BY MOUTH EVERY 6 HOURS AS NEEDED DX PAIN   Class: Print   Earliest Fill Date: 8/30/2019   Notes to Pharmacy: Please cancel any scripts on file or received recently   Reason for Discontinue: Duplicate order       Who prescribed the medication?: Dr. Chung  What is your question/concern?: Caller stated he was informed that the hard copy of Oxycodone was mailed but they haven't received it. Caller would like another copy mailed. See refill encounter on 8/29/19.  Pharmacy: [facility] [place]  Okay to leave a detailed message?: No, [phone]  Site CMT - Please call the pharmacy to obtain any additional needed information.

## 2021-06-01 NOTE — PROGRESS NOTES
[facility]      Facility:    [facility] [[id number]] Code Status: DNR      Chief Complaint/Reason for Visit:   Chief Complaint   Patient presents with     Discharge Summary       HPI:   Manjeet is a 84 y.o. male who was initially admitted to [facility] admission on 9/1/2019 and discharged on 9/5/2019.  He has a past medical history of CAD status post CABG in 1983 and a stent placed in 2009, Alzheimer's dementia, TIA, heart failure reduced EF, HDL, hypertension, GERD who was admitted with a 2-day history of some increased confusion and lethargy with maroon stools x2 days.  He was on oxycodone so it was discontinued per his encephalopathy.  Initial hemoglobin was 11.2, then dropped to 8.8 with IV fluids though at discharge was 9.9.  Per his mental status family decided not to elect further work-up for his acute GI bleed as he started having regular stools.  His PPI was increased to 20 mg twice daily and his aspirin and pocket ago were held.  He also did have a new A. fib, cardiology was consulted.  They recommended warfarin though in light his core mobilities just an aspirin was recommended.  Of note he was taking oxycodone per vertebral compression fractures 2 months ago though seems to be without pain.  Per disposition he is unable to return back to his memory care unit and will be discharged to a skilled facility.  Per nutrition his intake has been poor and his unable to feed himself, did receive hospice consult and will possibly pursue in care center. He did receive evaluation from [facility] initially though did not qualify.    Today informed he will be discharging to [facility] on 9/11/19.    Past Medical History:  Past Medical History:   Diagnosis Date     Asthma      Chronic back pain      Chronic sinusitis      Compression fracture of body of thoracic vertebra (H) 7/7/2019     Coronary artery disease      Dementia      Dyslipidemia      GERD  (gastroesophageal reflux disease)      Hemorrhoids      HTN (hypertension)      Hyperlipidemia      Osteoporosis      Paroxysmal atrial fibrillation (H) 9/4/2019     TIA (transient ischemic attack)      Urinary retention 9/3/2019           Surgical History:  Past Surgical History:   Procedure Laterality Date     CORONARY ARTERY BYPASS GRAFT       FEMUR FRACTURE SURGERY Right 2005     FEMUR FRACTURE SURGERY Left 2002     SPINE SURGERY  1978, 2004       Family History:   No family history on file.    Social History:    Social History     Socioeconomic History     Marital status:      Spouse name: Not on file     Number of children: Not on file     Years of education: Not on file     Highest education level: Not on file   Occupational History     Not on file   Social Needs     Financial resource strain: Not on file     Food insecurity:     Worry: Not on file     Inability: Not on file     Transportation needs:     Medical: Not on file     Non-medical: Not on file   Tobacco Use     Smoking status: Former Smoker     Smokeless tobacco: Never Used   Substance and Sexual Activity     Alcohol use: No     Drug use: No     Sexual activity: Not on file   Lifestyle     Physical activity:     Days per week: Not on file     Minutes per session: Not on file     Stress: Not on file   Relationships     Social connections:     Talks on phone: Not on file     Gets together: Not on file     Attends Faith service: Not on file     Active member of club or organization: Not on file     Attends meetings of clubs or organizations: Not on file     Relationship status: Not on file     Intimate partner violence:     Fear of current or ex partner: Not on file     Emotionally abused: Not on file     Physically abused: Not on file     Forced sexual activity: Not on file   Other Topics Concern     Not on file   Social History Narrative     Not on file          Review of Systems   Constitutional: Positive for activity change, appetite  change and fatigue. Negative for chills and diaphoresis.        No concerns    HENT: Positive for hearing loss. Negative for congestion.    Eyes: Negative.    Respiratory: Negative for shortness of breath and wheezing.    Cardiovascular: Negative.    Gastrointestinal: Negative for abdominal distention, abdominal pain, anal bleeding, blood in stool, constipation, diarrhea and nausea.        No melena reported   Endocrine: Negative.    Genitourinary: Negative for difficulty urinating.        Incontinent   Musculoskeletal:        Denies pain   Skin:        intact   Allergic/Immunologic: Negative.    Neurological: Positive for weakness. Negative for dizziness, tremors, speech difficulty and light-headedness.        Generalized   Hematological: Negative.    Psychiatric/Behavioral: Positive for confusion. Negative for agitation, dysphoric mood and sleep disturbance. The patient is not nervous/anxious.        Vitals:    09/11/19 0957   BP: 116/65   Pulse: 98   Resp: 18   Temp: 98  F (36.7  C)   SpO2: 92%   Weight: 141 lb (64 kg)       Physical Exam   Constitutional: No distress.   Did not qualify for hospice. He will be discharging today   HENT:   Head: Normocephalic and atraumatic.   Mouth/Throat: Oropharynx is clear and moist. No oropharyngeal exudate.   Eyes: Right eye exhibits no discharge. Left eye exhibits no discharge. No scleral icterus.   Neck: Normal range of motion. Neck supple. No JVD present.   Cardiovascular:   IRR   Pulmonary/Chest: Effort normal. No stridor. No respiratory distress. He has no wheezes. He has no rales.   Dim, RA   Abdominal: Soft. He exhibits no distension. There is no tenderness. There is no rebound.   No melena reported   Genitourinary:   Genitourinary Comments: Recently started on tamsulosin   Musculoskeletal: He exhibits no edema, tenderness or deformity.   Denies pain   Neurological: He is alert.   Alert and oriented x1/2, has minimal recall   Skin: Skin is warm and dry. He is not  diaphoretic.   Intact   Psychiatric: He has a normal mood and affect.   No behaviors reported   Vitals reviewed.      Medication List:  Current Outpatient Medications   Medication Sig     acetaminophen (TYLENOL) 500 MG tablet Take 2 tablets (1,000 mg total) by mouth 3 (three) times a day. (Patient taking differently: Take 1,000 mg by mouth 3 (three) times a day.       )     aspirin 81 MG EC tablet Take 81 mg by mouth daily.     docusate sodium (COLACE) 50 mg/5 mL oral liquid Take 10 mL (100 mg total) by mouth 2 (two) times a day.     escitalopram oxalate (LEXAPRO) 20 MG tablet Take 20 mg by mouth at bedtime.     lidocaine 4 % patch Place 1 patch on the skin daily. Place on back. Remove and discard patch with 12 hours. (Patient taking differently: Place 1 patch on the skin daily. Place on back in morning and remove at bedtime. On 12 hours, off for 12 hours      )     melatonin 10 mg TbER Take 1 tablet by mouth at bedtime.     metoprolol succinate (TOPROL-XL) 25 MG Take 1.5 tablets (37.5 mg total) by mouth daily.     multivitamin with minerals (THERA-M) 9 mg iron-400 mcg Tab tablet Take 1 tablet by mouth daily.     omeprazole (PRILOSEC) 20 MG capsule Take 1 capsule (20 mg total) by mouth 2 (two) times a day before meals. Open contents of capsule and sprinkle into room temp applesauce.     polyethylene glycol (MIRALAX) 17 gram packet Take 1 packet (17 g total) by mouth daily as needed.     tamsulosin (FLOMAX) 0.4 mg cap Take 1 capsule (0.4 mg total) by mouth every morning.       Labs:  Results for orders placed or performed in visit on 09/07/19   Basic Metabolic Panel   Result Value Ref Range    Sodium 132 (L) 136 - 145 mmol/L    Potassium 3.6 3.5 - 5.0 mmol/L    Chloride 95 (L) 98 - 107 mmol/L    CO2 29 22 - 31 mmol/L    Anion Gap, Calculation 8 5 - 18 mmol/L    Glucose 91 70 - 125 mg/dL    Calcium 8.7 8.5 - 10.5 mg/dL    BUN 13 8 - 28 mg/dL    Creatinine 0.70 0.70 - 1.30 mg/dL    GFR MDRD Af Amer >60 >60  mL/min/1.73m2    GFR MDRD Non Af Amer >60 >60 mL/min/1.73m2     Lab Results   Component Value Date    WBC 11.8 (H) 09/07/2019    HGB 10.4 (L) 09/07/2019    HCT 31.5 (L) 09/07/2019    MCV 91 09/07/2019     09/07/2019     Lab Results   Component Value Date    TSH 2.36 11/05/2017     Lab Results   Component Value Date    [id number] 306 10/07/2011     Assessment/Plan:    GI bleed: Last hemoglobin 10.4, no melena reported.  Family declined work-up.  GI started omeprazole 20 mg twice daily    Hospice consult: Hospice evaluation pending    Leukocytosis: Last white count 11.8 on 9/7, started on cipro 250mg two times a day x3d per E coli UTI, resolved    History of CAD with stenting: Due to recent bleed advised to continue aspirin 81 mg daily only    Hypertension: Recently metoprolol succinate decreased to 37.5 mg daily, monitor blood pressure in new facility    History of compression fracture: Schedule oxycodone discontinued in hospital, continue Tylenol 1000 mg 3 times daily with lidocaine patch.  Denies pain    Depression: Continue escitalopram 20 mg daily    Insomnia: Continue melatonin 10 mg at bedtime    Constipation: Continue docusate 100 mg twice daily and MiraLAX daily as needed    Polypharmacy: Vitamin D, clopidogrel, simvastatin were discontinued in hospital    Disposition: he will reside in long-term care and now discharged to [facility] on 9/11/19     The care plan has been reviewed and all orders signed. Changes to care plan, if any, as noted. Otherwise, continue care plan of care.  The total time spent with this patient was 31 minutes, with greater than 50% spent in counseling and coordination of care that included multiple issues per pain control, monitoring for melena with staff, hospice and discharge which lasted 16 minutes.    Post Discharge Medication Reconciliation Status: discharge medications reconciled, continue medications without change      Electronically signed by: Ollie Fried  NP

## 2021-06-01 NOTE — PROGRESS NOTES
[facility]      Facility:    [facility] [[id number]] Code Status: DNR      Chief Complaint/Reason for Visit:   Chief Complaint   Patient presents with     Review Of Multiple Medical Conditions       HPI:   Manjeet is a 84 y.o. male who was initially admitted to [facility] admission on 9/1/2019 and discharged on 9/5/2019.  He has a past medical history of CAD status post CABG in 1983 and a stent placed in 2009, Alzheimer's dementia, TIA, heart failure reduced EF, HDL, hypertension, GERD who was admitted with a 2-day history of some increased confusion and lethargy with maroon stools x2 days.  He was on oxycodone so it was discontinued per his encephalopathy.  Initial hemoglobin was 11.2, then dropped to 8.8 with IV fluids though at discharge was 9.9.  Per his mental status family decided not to elect further work-up for his acute GI bleed as he started having regular stools.  His PPI was increased to 20 mg twice daily and his aspirin and pocket ago were held.  He also did have a new A. fib, cardiology was consulted.  They recommended warfarin though in light his core mobilities just an aspirin was recommended.  Of note he was taking oxycodone per vertebral compression fractures 2 months ago though seems to be without pain.  Per disposition he is unable to return back to his memory care unit and will be discharged to a skilled facility.  Per nutrition his intake has been poor and his unable to feed himself, did receive hospice consult and will possibly pursue in care center.    Past Medical History:  Past Medical History:   Diagnosis Date     Asthma      Chronic back pain      Chronic sinusitis      Compression fracture of body of thoracic vertebra (H) 7/7/2019     Coronary artery disease      Dementia      Dyslipidemia      GERD (gastroesophageal reflux disease)      Hemorrhoids      HTN (hypertension)      Hyperlipidemia      Osteoporosis      Paroxysmal  atrial fibrillation (H) 9/4/2019     TIA (transient ischemic attack)      Urinary retention 9/3/2019           Surgical History:  Past Surgical History:   Procedure Laterality Date     CORONARY ARTERY BYPASS GRAFT       FEMUR FRACTURE SURGERY Right 2005     FEMUR FRACTURE SURGERY Left 2002     SPINE SURGERY  1978, 2004       Family History:   No family history on file.    Social History:    Social History     Socioeconomic History     Marital status:      Spouse name: Not on file     Number of children: Not on file     Years of education: Not on file     Highest education level: Not on file   Occupational History     Not on file   Social Needs     Financial resource strain: Not on file     Food insecurity:     Worry: Not on file     Inability: Not on file     Transportation needs:     Medical: Not on file     Non-medical: Not on file   Tobacco Use     Smoking status: Former Smoker     Smokeless tobacco: Never Used   Substance and Sexual Activity     Alcohol use: No     Drug use: No     Sexual activity: Not on file   Lifestyle     Physical activity:     Days per week: Not on file     Minutes per session: Not on file     Stress: Not on file   Relationships     Social connections:     Talks on phone: Not on file     Gets together: Not on file     Attends Bahai service: Not on file     Active member of club or organization: Not on file     Attends meetings of clubs or organizations: Not on file     Relationship status: Not on file     Intimate partner violence:     Fear of current or ex partner: Not on file     Emotionally abused: Not on file     Physically abused: Not on file     Forced sexual activity: Not on file   Other Topics Concern     Not on file   Social History Narrative     Not on file          Review of Systems   Constitutional: Positive for activity change, appetite change and fatigue. Negative for chills and diaphoresis.   HENT: Positive for hearing loss. Negative for congestion.    Eyes:  Negative.    Respiratory: Negative for shortness of breath and wheezing.    Cardiovascular: Negative.    Gastrointestinal: Negative for abdominal distention, abdominal pain, anal bleeding, blood in stool, constipation, diarrhea and nausea.   Endocrine: Negative.    Genitourinary: Negative for difficulty urinating.        Incontinent   Musculoskeletal:        Denies pain   Skin:        intact   Allergic/Immunologic: Negative.    Neurological: Positive for weakness. Negative for dizziness, tremors, speech difficulty and light-headedness.        Generalized   Hematological: Negative.    Psychiatric/Behavioral: Positive for confusion. Negative for agitation, dysphoric mood and sleep disturbance. The patient is not nervous/anxious.        Vitals:    09/06/19 0848   BP: 109/70   Pulse: 90   Resp: 18   Temp: 98  F (36.7  C)   SpO2: 98%   Weight: 141 lb (64 kg)       Physical Exam   Constitutional: No distress.   Being evaluated for hospice   HENT:   Head: Normocephalic and atraumatic.   Mouth/Throat: Oropharynx is clear and moist. No oropharyngeal exudate.   Eyes: Right eye exhibits no discharge. Left eye exhibits no discharge. No scleral icterus.   Neck: Normal range of motion. Neck supple. No JVD present.   Cardiovascular:   IRR   Pulmonary/Chest: Effort normal. No stridor. No respiratory distress. He has no wheezes. He has no rales.   Dim, RA   Abdominal: Soft. He exhibits no distension. There is no tenderness. There is no rebound.   No melena reported   Genitourinary:   Genitourinary Comments: Recently started on tamsulosin   Musculoskeletal: He exhibits no edema, tenderness or deformity.   Denies pain   Neurological: He is alert.   Alert and oriented x1/2, has minimal recall   Skin: Skin is warm and dry. He is not diaphoretic.   Intact   Psychiatric: He has a normal mood and affect.   No behaviors reported   Vitals reviewed.      Medication List:  Current Outpatient Medications   Medication Sig     acetaminophen  (TYLENOL) 500 MG tablet Take 2 tablets (1,000 mg total) by mouth 3 (three) times a day. (Patient taking differently: Take 1,000 mg by mouth 3 (three) times a day.       )     aspirin 81 MG EC tablet Take 81 mg by mouth daily.     docusate sodium (COLACE) 50 mg/5 mL oral liquid Take 10 mL (100 mg total) by mouth 2 (two) times a day.     escitalopram oxalate (LEXAPRO) 20 MG tablet Take 20 mg by mouth at bedtime.     lidocaine 4 % patch Place 1 patch on the skin daily. Place on back. Remove and discard patch with 12 hours. (Patient taking differently: Place 1 patch on the skin daily. Place on back in morning and remove at bedtime. On 12 hours, off for 12 hours      )     melatonin 10 mg TbER Take 1 tablet by mouth at bedtime.     metoprolol succinate (TOPROL-XL) 25 MG Take 1.5 tablets (37.5 mg total) by mouth daily.     multivitamin with minerals (THERA-M) 9 mg iron-400 mcg Tab tablet Take 1 tablet by mouth daily.     omeprazole (PRILOSEC) 20 MG capsule Take 1 capsule (20 mg total) by mouth 2 (two) times a day before meals. Open contents of capsule and sprinkle into room temp applesauce.     polyethylene glycol (MIRALAX) 17 gram packet Take 1 packet (17 g total) by mouth daily as needed.     tamsulosin (FLOMAX) 0.4 mg cap Take 1 capsule (0.4 mg total) by mouth every morning.       Labs:  Results for orders placed or performed during the hospital encounter of 07/07/19   Basic Metabolic Panel   Result Value Ref Range    Sodium 138 136 - 145 mmol/L    Potassium 3.8 3.5 - 5.0 mmol/L    Chloride 104 98 - 107 mmol/L    CO2 27 22 - 31 mmol/L    Anion Gap, Calculation 7 5 - 18 mmol/L    Glucose 91 70 - 125 mg/dL    Calcium 8.6 8.5 - 10.5 mg/dL    BUN 17 8 - 28 mg/dL    Creatinine 0.83 0.70 - 1.30 mg/dL    GFR MDRD Af Amer >60 >60 mL/min/1.73m2    GFR MDRD Non Af Amer >60 >60 mL/min/1.73m2     Lab Results   Component Value Date    WBC 11.8 (H) 09/07/2019    HGB 10.4 (L) 09/07/2019    HCT 31.5 (L) 09/07/2019    MCV 91 09/07/2019      09/07/2019     Lab Results   Component Value Date    TSH 2.36 11/05/2017     Lab Results   Component Value Date    [id number] 306 10/07/2011     Assessment/Plan:    GI bleed: Last hemoglobin 10.4, no melena reported.  Family declined work-up.  GI started omeprazole 20 mg twice daily    Hospice consult: Hospice evaluation pending    Leukocytosis: Last white count 11.8 on 9/7, UAUC ordered    History of CAD with stenting: Due to recent bleed advised to continue aspirin 81 mg daily only    Hypertension: Recently metoprolol succinate decreased to 37.5 mg daily, monitor blood pressure    History of compression fracture: Schedule oxycodone discontinued, continue Tylenol 1000 mg 3 times daily with lidocaine patch.  Denies pain    Depression: Continue escitalopram 20 mg daily    Insomnia: Continue melatonin 10 mg at bedtime    Constipation: Continue docusate 100 mg twice daily and MiraLAX daily as needed    Polypharmacy: Vitamin D, clopidogrel, simvastatin were discontinued in hospital    Disposition: We will reside in long-term care, hospice consult pending    The care plan has been reviewed and all orders signed. Changes to care plan, if any, as noted. Otherwise, continue care plan of care.  The total time spent with this patient was 35 minutes, with greater than 50% spent in counseling and coordination of care that included multiple issues per pain control, monitoring for melena with staff and hospice consult which lasted 18 minutes.    Post Discharge Medication Reconciliation Status: discharge medications reconciled, continue medications without change      Electronically signed by: Ollie Fried NP

## 2021-06-02 VITALS — HEIGHT: 69 IN | BODY MASS INDEX: 25.03 KG/M2 | WEIGHT: 169 LBS

## 2021-06-02 VITALS — BODY MASS INDEX: 24.96 KG/M2 | WEIGHT: 169 LBS

## 2021-06-02 VITALS — HEIGHT: 69 IN | WEIGHT: 173 LBS | BODY MASS INDEX: 25.62 KG/M2

## 2021-06-03 ENCOUNTER — RECORDS - HEALTHEAST (OUTPATIENT)
Dept: ADMINISTRATIVE | Facility: CLINIC | Age: 86
End: 2021-06-03

## 2021-06-03 VITALS
SYSTOLIC BLOOD PRESSURE: 109 MMHG | TEMPERATURE: 98 F | BODY MASS INDEX: 19.67 KG/M2 | DIASTOLIC BLOOD PRESSURE: 70 MMHG | RESPIRATION RATE: 18 BRPM | OXYGEN SATURATION: 98 % | WEIGHT: 141 LBS | HEART RATE: 90 BPM

## 2021-06-03 VITALS
SYSTOLIC BLOOD PRESSURE: 116 MMHG | TEMPERATURE: 98 F | WEIGHT: 141 LBS | BODY MASS INDEX: 19.67 KG/M2 | OXYGEN SATURATION: 92 % | RESPIRATION RATE: 18 BRPM | HEART RATE: 98 BPM | DIASTOLIC BLOOD PRESSURE: 65 MMHG

## 2021-06-03 VITALS — BODY MASS INDEX: 24.22 KG/M2 | WEIGHT: 164 LBS

## 2021-06-09 NOTE — PROGRESS NOTES
[facility] Follow Up Note    Manjeet Oshea   82 y.o. male    Date of Visit: 3/29/2017    Chief Complaint   Patient presents with     Pain     left side lower rib cage, about 2 weeks     Subjective  This is an 82-year-old man who comes in primarily because of some pain in the lower left rib cage anteriorly.  This started about 2 weeks ago.  There were no falls.  He has a vague recollection that he was going through a heavy door at the time and the door swung into him and may have bumps of the chest wall.  He is a little vague in his memory of this but that is the only thing he can associate with the onset of the pain.  He is not particularly short of breath and he is able to sleep at night but taking a deep breath does increase the pain.  He does not think there has been any significant change over these 2 weeks.  He has otherwise been in his usual state of health.    ROS A comprehensive review of systems was performed and was otherwise negative    Medications, allergies, and problem list were reviewed and updated    Exam  General Appearance:   On examination his blood pressure is 110/60.  Weight is 176 pounds and height is 66 inches.  BMI is 28.41.    Heart is in a sinus rhythm with a rate of 88 and no ectopy.    Lungs are clear with good breath sounds in that left lower chest.    There is tenderness along the lower ribs anteriorly on the left side.    The patient is alert and oriented ×3.      Assessment/Plan  1. Chest wall pain  XR Chest PA and Lateral     This is probably some bruising or strain in the lower left chest wall.  We discussed this and my recommendation is that it should get better over the next 2-4 weeks.  I told him that he could apply heat and also use his usual pain medication.  We will do a chest x-ray just to be sure there is nothing else going on.  We will contact him with these results.  Body Mass Index was not assessed due to The patient was in with an acute  medical issue..    Colby Martines MD      Current Outpatient Prescriptions on File Prior to Visit   Medication Sig     acetaminophen (TYLENOL) 325 MG tablet Take by mouth as needed for pain.     aspirin 81 MG EC tablet Take 81 mg by mouth daily.     calcium citrate-vitamin D (CITRACAL+D) 315-200 mg-unit per tablet Take 1 tablet by mouth daily.     cholecalciferol, vitamin D3, (VITAMIN D3) 2,000 unit cap Take 1 capsule by mouth daily.     clopidogrel (PLAVIX) 75 mg tablet Take 1 tablet by mouth  daily     metoprolol (TOPROL-XL) 100 MG 24 hr tablet Take 100 mg by mouth daily.     MULTIVIT &MINERALS/FERROUS FUM (MULTI VITAMIN ORAL) Take 1 tablet by mouth.     omeprazole (PRILOSEC) 20 MG capsule Take 1 capsule by mouth  daily     simvastatin (ZOCOR) 40 MG tablet Take 40 mg by mouth bedtime.     traMADol (ULTRAM) 50 mg tablet Take 50 mg by mouth every 6 (six) hours as needed for pain.     No current facility-administered medications on file prior to visit.      No Known Allergies  Social History   Substance Use Topics     Smoking status: Former Smoker     Smokeless tobacco: Never Used     Alcohol use None

## 2021-06-10 NOTE — PROGRESS NOTES
[facility] Follow Up Note    Manjeet Oshea   82 y.o. male    Date of Visit: 5/22/2017    Chief Complaint   Patient presents with     Pain     back above right kidney- all day     Subjective  This is an 82-year-old man who just walked in today because he was unable to get through on the telephone.  He comes in with pain in the right posterior chest wall region.  He is a little vague but it seems to have started last evening after he sat in his recliner in which he sleeps.  He tells me that he was able to sleep through the night but this morning awoke with some significant pain in the right posterior chest wall.  It did hurt take a deep breath.  However, he was not short of breath.  The pain did not radiate.  No fever no chills and no cough.  The pain is persisted throughout the day and that is why he came in.  He has no other complaints or problems at this time.  On examination his blood pressure is 120/62.  Weight is 100    ROS A comprehensive review of systems was performed and was otherwise negative    Medications, allergies, and problem list were reviewed and updated    Exam  General Appearance: Blood pressure is 120/62.  Weight is  1 75 pounds and height is 66 inches.  BMI is 28.25.    Heart is in sinus rhythm with a rate of 80 and no ectopy.    Right lung is clear.  He is definitely tender in the posterior rib cage region.  He identifies a particular spot.  It is tender when he takes a deep breath or coughs.    Patient is alert and oriented ×3.      Assessment/Plan  1. Chest wall pain       Right chest wall pain.  This would appear to be a musculoskeletal pain.  Based on its onset and progression over the last day I do not think it is anything else.  I did discuss this with him and recommended he try alternating some heat and cold and that he continue to use 2 Tylenol or per 6 hours or so for the pain.  I advised him to follow-up with me in a couple of days if he does not see some  sign of improvement.  I also cautioned him to call or go to the emergency room if there is any worsening or change in the symptoms.  Body Mass Index was not assessed due to The patient was in with an acute medical issue..    Colby Martines MD      Current Outpatient Prescriptions on File Prior to Visit   Medication Sig     acetaminophen (TYLENOL) 325 MG tablet Take by mouth as needed for pain.     aspirin 81 MG EC tablet Take 81 mg by mouth daily.     calcium citrate-vitamin D (CITRACAL+D) 315-200 mg-unit per tablet Take 1 tablet by mouth daily.     cholecalciferol, vitamin D3, (VITAMIN D3) 2,000 unit cap Take 1 capsule by mouth daily.     clopidogrel (PLAVIX) 75 mg tablet Take 1 tablet by mouth  daily     metoprolol (TOPROL-XL) 100 MG 24 hr tablet Take 100 mg by mouth daily.     MULTIVIT &MINERALS/FERROUS FUM (MULTI VITAMIN ORAL) Take 1 tablet by mouth.     omeprazole (PRILOSEC) 20 MG capsule Take 1 capsule by mouth  daily     simvastatin (ZOCOR) 40 MG tablet Take 40 mg by mouth bedtime.     traMADol (ULTRAM) 50 mg tablet Take 50 mg by mouth every 6 (six) hours as needed for pain.     No current facility-administered medications on file prior to visit.      No Known Allergies  Social History   Substance Use Topics     Smoking status: Former Smoker     Smokeless tobacco: Never Used     Alcohol use None

## 2021-06-11 NOTE — PROGRESS NOTES
[facility] Follow Up Note    Manjeet Oshea   82 y.o. male    Date of Visit: 6/29/2017    Chief Complaint   Patient presents with     Follow-up     inpatient [facility] TIA,     Subjective  This is an 82-year-old man who is in to follow-up on a recent brief hospitalization at [facility] for an apparent TIA.  He had been in his usual state of health until last week when he suddenly developed severe weakness in the right arm.  He reports to me that about 15 minutes prior to this episode he was smelling his cats MIP to see if it had any odor to it.  He wondered if there was a relationship but I cannot think of 1.  In any case, he ended up in the emergency room at [facility] with concerns over a stroke or TIA.  He underwent an extensive workup over a 2 day period including scans of the neck and head all of which were unremarkable for stroke or bleeding.  There conclusion was that this was a TIA and they placed him on aspirin and Plavix.  He is in today for follow-up.  They had wondered about the possibility of a cardiac rhythm disturbance leading to the TIA and suggested reconnecting with his cardiologist to assess for the need of a Holter or event monitor.  Since discharge the patient has been feeling fine with no recurrence of his symptoms.    ROS A comprehensive review of systems was performed and was otherwise negative    Medications, allergies, and problem list were reviewed and updated    Exam  General Appearance:   On examination his blood pressure is 106/66.  Weight is 160 pounds and BMI is 25.82.    Heart is in a sinus rhythm with a rate of 72 and no ectopy.    Cranial nerves are intact.    Gait is pretty much at baseline.    No apparent extremity weakness.    The patient is alert and oriented ×3.      Assessment/Plan  1. Coronary atherosclerosis  Ambulatory referral to [facility]   2. TIA (transient ischemic attack)  Ambulatory referral to [facility]      Probable TIA.  He will continue the aspirin and Plavix.  I did discuss this with him and agree that a visit to cardiology for discussion of monitoring might be a good idea.  He is agreeable and so we will set up a consultation.  I will follow-up with him after this consultation is been obtained.  For now he will continue his current medication.    Total time of this office visit was 25 minutes with greater than 50% of the time spent in care coordination and patient counseling.      Colby Martines MD      Current Outpatient Prescriptions on File Prior to Visit   Medication Sig     acetaminophen (TYLENOL) 325 MG tablet Take by mouth as needed for pain.     aspirin 81 MG EC tablet Take 81 mg by mouth daily.     calcium citrate-vitamin D (CITRACAL+D) 315-200 mg-unit per tablet Take 1 tablet by mouth daily.     cholecalciferol, vitamin D3, (VITAMIN D3) 2,000 unit cap Take 1 capsule by mouth daily.     clopidogrel (PLAVIX) 75 mg tablet Take 1 tablet by mouth  daily     metoprolol (TOPROL-XL) 100 MG 24 hr tablet Take 100 mg by mouth daily.     MULTIVIT &MINERALS/FERROUS FUM (MULTI VITAMIN ORAL) Take 1 tablet by mouth.     omeprazole (PRILOSEC) 20 MG capsule Take 1 capsule by mouth  daily     simvastatin (ZOCOR) 40 MG tablet Take 40 mg by mouth bedtime.     traMADol (ULTRAM) 50 mg tablet Take 1 tablet (50 mg total) by mouth every 6 (six) hours as needed for pain.     No current facility-administered medications on file prior to visit.      No Known Allergies  Social History   Substance Use Topics     Smoking status: Former Smoker     Smokeless tobacco: Never Used     Alcohol use None

## 2021-06-11 NOTE — PROGRESS NOTES
[facility] Follow Up Note    Manjeet Oshea   82 y.o. male    Date of Visit: 7/19/2017    Chief Complaint   Patient presents with     Diarrhea     few days, no change in eating     Subjective  This is an 82-year-old man who comes in today primarily because of diarrhea.  About 2-3 days ago.  He denies any melena or hilary blood in the stool.  No fever or chills.  No nausea or vomiting.  No abdominal pain.  He is beginning to develop some rectal discomfort.  He does not recall eating any unusual foods or any major changes in his diet.  There is been no travel within the past week.  The symptoms are not necessarily worsening but neither are they improving.  Otherwise he is been in his usual state of health and offers no other complaints.  He is being evaluated for cardiac rhythm problems and is wearing a 30 day event monitor.    ROS A comprehensive review of systems was performed and was otherwise negative    Medications, allergies, and problem list were reviewed and updated    Exam  General Appearance:   On examination today his blood pressure is 118/60.  Weight is 155 pounds and height is 66 inches.  BMI is 25.02.    Heart is in a sinus rhythm with a rate of 102 and no ectopy.    Abdomen is soft and nontender.  No distention.  No obvious masses or organomegaly.    The patient is alert and oriented ×3.      Assessment/Plan  1. Diarrhea       Short history of diarrhea.  There are no physical findings and my suspicion is that this is most likely viral.  I advised him to get on a soft and bland diet and try some Imodium for the next day or 2.  If the symptoms do not clear he will let me know and we will consider doing stool cultures.      Cloby Martines MD      Current Outpatient Prescriptions on File Prior to Visit   Medication Sig     acetaminophen (TYLENOL) 325 MG tablet Take by mouth as needed for pain.     aspirin 81 MG EC tablet Take 81 mg by mouth daily.     calcium citrate-vitamin D  (CITRACAL+D) 315-200 mg-unit per tablet Take 1 tablet by mouth daily.     cholecalciferol, vitamin D3, (VITAMIN D3) 2,000 unit cap Take 1 capsule by mouth daily.     clopidogrel (PLAVIX) 75 mg tablet Take 1 tablet by mouth  daily     metoprolol (TOPROL-XL) 100 MG 24 hr tablet Take 100 mg by mouth daily.     MULTIVIT &MINERALS/FERROUS FUM (MULTI VITAMIN ORAL) Take 1 tablet by mouth.     omeprazole (PRILOSEC) 20 MG capsule Take 1 capsule by mouth  daily     simvastatin (ZOCOR) 40 MG tablet Take 40 mg by mouth bedtime.     traMADol (ULTRAM) 50 mg tablet Take 1 tablet (50 mg total) by mouth every 6 (six) hours as needed for pain.     No current facility-administered medications on file prior to visit.      No Known Allergies  Social History   Substance Use Topics     Smoking status: Former Smoker     Smokeless tobacco: Never Used     Alcohol use None

## 2021-06-12 NOTE — PROGRESS NOTES
ASSESSMENT:     Diagnoses and all orders for this visit:    Lumbalgia  -     MR Lumbar Spine Without Contrast; Future; Expected date: 8/15/17  -     tiZANidine (ZANAFLEX) 2 MG tablet; Take 1 tablet at qh, prn,  for muscle spasm,  Dispense: 90 tablet; Refill: 0  -     predniSONE (DELTASONE) 20 MG tablet; Take 2 tablet daily for 5 days.  Dispense: 10 tablet; Refill: 0    Thoracic back pain  -     MR Lumbar Spine Without Contrast; Future; Expected date: 8/15/17  -     tiZANidine (ZANAFLEX) 2 MG tablet; Take 1 tablet at qh, prn,  for muscle spasm,  Dispense: 90 tablet; Refill: 0  -     predniSONE (DELTASONE) 20 MG tablet; Take 2 tablet daily for 5 days.  Dispense: 10 tablet; Refill: 0    Myofascial pain  -     MR Lumbar Spine Without Contrast; Future; Expected date: 8/15/17  -     tiZANidine (ZANAFLEX) 2 MG tablet; Take 1 tablet at qh, prn,  for muscle spasm,  Dispense: 90 tablet; Refill: 0  -     predniSONE (DELTASONE) 20 MG tablet; Take 2 tablet daily for 5 days.  Dispense: 10 tablet; Refill: 0    Sleep disturbance            Manjeet Oshea is a 82 y.o. male  with a BMI of Body mass index is 24.66 kg/(m^2). who presents today in consultation at the request of Colby Gatica MD  for new patient evaluation of chronic thoracic and low back pain, with recent flareup 1 week ago without a preceding trauma.  Patient symptoms are likely myofascial as well as due to the pronounced thoracic kyphosis and scoliosis.  We will obtain a lumbar MRI to further evaluate for his symptoms.  I prescribed prednisone 20 mg to be taken 2 tablets daily for 5 days.  Tizanidine 2 mg at nighttime for muscle spasm.  Patient may continue on tramadol 50 mg 1 tablet 3 times daily as needed for pain.  No red flags.          PAOLA:  60  WHO-5:  14    PLAN:  A shared decision making model was used.  The patient's values and choices were respected.  The following represents what was discussed and decided upon by the physician and the patient.       1.  DIAGNOSTIC TESTS: I Ordered lumbar MRI.  2.  PHYSICAL THERAPY:  Discussed the importance of core strengthening, ROM, stretching exercises with the patient and how each of these entities is important in decreasing pain.  Explained to the patient that the purpose of physical therapy is to teach the patient a home exercise program.  These exercises need to be performed every day in order to decrease pain and prevent future occurrences of pain.  Likened it to brushing one's teeth.  Patient will start on physical therapy program.   3.  MEDICATIONS: Patient will start on prednisone 20 mg 2 tablets daily for 5 days.  Tizanidine 2 mg at nighttime for muscle spasm.  4.  INTERVENTIONS: No therapeutic steroid injections at this time.   5.  PATIENT EDUCATION: I thoroughly discussed the plan with the patient today.  He verbalizes understanding and agrees with the plan.      FOLLOW-UP:   Patient will follow up with me in 1 weeks.  All questions were answered.      JADYN Wright, MPA-C          SUBJECTIVE:  Manjeet Oshea  Is a 82 y.o. male who presents today for new patient evaluation of low back pain.  Patient reports history of chronic thoracic and low back pain for years.  He reports worsening of his symptoms in the last week without a preceding trauma.  At this time he reports 9 out of 10 intensity diffuse pain in the thoracic and the lumbar region bilaterally without radicular pain to the lower extremity.  His symptoms are aggravated by standing, walking and rates it at 10+ out of 10 intensity on its worse.  His symptoms are alleviated by taking tramadol 50 mg 1-2 tablets daily as needed for pain.  Patient reports history of 2 back surgeries, where the first surgery was back in 1980s, and the and the second  surgery was in 2004, for disc herniation. Patient denies urinary or bowel incontinence, unintentional weight loss, saddle anesthesia, fever or chills, balance difficulties.      Medications:    Current  Outpatient Prescriptions   Medication Sig Dispense Refill     acetaminophen (TYLENOL) 325 MG tablet Take by mouth as needed for pain.       aspirin 81 MG EC tablet Take 81 mg by mouth daily.       calcium citrate-vitamin D (CITRACAL+D) 315-200 mg-unit per tablet Take 1 tablet by mouth daily.       cholecalciferol, vitamin D3, (VITAMIN D3) 2,000 unit cap Take 1 capsule by mouth daily.       clopidogrel (PLAVIX) 75 mg tablet Take 1 tablet by mouth  daily 90 tablet 3     metoprolol (TOPROL-XL) 100 MG 24 hr tablet Take 100 mg by mouth daily.       MULTIVIT &MINERALS/FERROUS FUM (MULTI VITAMIN ORAL) Take 1 tablet by mouth.       omeprazole (PRILOSEC) 20 MG capsule Take 1 capsule by mouth  daily 90 capsule 3     simvastatin (ZOCOR) 40 MG tablet Take 40 mg by mouth bedtime.       traMADol (ULTRAM) 50 mg tablet Take 1 tablet (50 mg total) by mouth every 6 (six) hours as needed for pain. 30 tablet 0     predniSONE (DELTASONE) 20 MG tablet Take 2 tablet daily for 5 days. 10 tablet 0     tiZANidine (ZANAFLEX) 2 MG tablet Take 1 tablet at qh, prn,  for muscle spasm, 90 tablet 0     No current facility-administered medications for this encounter.        Allergies: No Known Allergies    PMH:    Past Medical History:   Diagnosis Date     Chronic back pain      Chronic sinusitis      Coronary artery disease      GERD (gastroesophageal reflux disease)      Hyperlipidemia      Osteoporosis        PSH: Back surgeries 1980, 2004.    Family History: Mother with skin cancer.  Father with stroke.    Social History:   Social History     Social History     Marital status:      Spouse name: N/A     Number of children: N/A     Years of education: N/A     Occupational History     Not on file.     Social History Main Topics     Smoking status: Former Smoker     Smokeless tobacco: Never Used     Alcohol use Not on file     Drug use: Not on file     Sexual activity: Not on file     Other Topics Concern     Not on file     Social History  "Narrative       ROS: Thoracic and low back pain.  Specifically negative for bowel/bladder dysfunction, fevers,chills, appetite changes, unexplained weight loss.   Otherwise 13 systems reviewed are negative.  Please see the patient's intake questionnaire from today for details.      OBJECTIVE:  PHYSICAL EXAMINATION:    CONSTITUTIONAL:  Vital signs as above.  No acute distress.  The patient is well nourished and well groomed.  PSYCHIATRIC:  The patient is awake, alert, oriented to person, place, time and answering questions appropriately with clear speech.    SKIN:  Skin over the face, bilateral lower extremities, and posterior torso is clean, dry, intact without rashes.    GAIT:  Gait is non-antalgic.  The patient is able to heel and toe walk without significant difficulty.    STANDING EXAMINATION: Significant thoracic kyphosis and scoliosis.  Diffuse tenderness of the thoracic spine.  Mild tenderness at the L4-L5, L5-S1 bilaterally.  MUSCLE STRENGTH:  The patient has 5/5 strength for the bilateral hip flexors, knee flexors/extensors, ankle dorsiflexors/plantar flexors, great toe extensors, ankle evertors/invertors.  NEUROLOGICAL:  1/4 patellar, medial hamstring, and achilles reflexes bilaterally.  Negative Babinski's bilaterally.  No ankle clonus bilaterally. Sensation to light touch is intact in the bilateral L4, L5, and S1 dermatomes.  VASCULAR:  2/4  pulses bilaterally.  Bilateral lower extremities are warm.  There is no pitting edema of the bilateral lower extremities.  ABDOMINAL:  Soft, non-distended, non-tender throughout all quadrants.  No pulsatile mass palpated in the left lower quadrant.  LYMPH NODES:  No palpable or tender inguinal/popliteal lymph nodes.  MUSCULOSKELETAL\": Patient reports significant pain with laying supine in his mid to lower back.  Negative straight leg raise bilaterally.  Unable to assess Baldev test due to severe pain.  Negative hip impingement bilaterally.      RESULTS: No imaging " to review today.

## 2021-06-12 NOTE — PROGRESS NOTES
Occupational Therapy    Medicare Insurance    Units: 1 Genial  Total Minutes: 50    Medical Diagnosis: Dementia  Treatment Diagnosis: Cognitive Impairment  Referring Practitioner: Dr. Uziel Bonilla  Date of onset: 8-14-17  Start of care: 8-21-17    Mr.William JOSÉ MIGUEL Oshea was referred by Dr. Bonilla for an occupational therapy outpatient cognitive evaluation. The assessments used in this evaluation will help determine if cognitive impairment is present and if so, how functional daily activity may be affected.  Following the assessments, the occupational therapist may offer education and recommendations to assist caregivers in providing the optimal level of support for their loved one. Manjeet was seen on 8/21/2017 and was accompanied by Jacquelyn, a family friend who is very involved in Vamshi's care, as his daughter lives out of state. He presented as slightly disheveled in appearance and ambulated slowly with a cane.  He was quiet with minimal initiation but was fully cooperative with the evaluation.     Living Arrangement:  Vamshi currently lives alone in his own home.   Homemaking:  He receives cleaning assistance twice a month and stated he manages his own shopping and cooking.   Financial Management: He has been managing his own finances but Jacquelyn stated his daughter is in the process of getting POA so she can manage his finance for him online from [place] where she lives.   Medications: Vamshi stated he manages his own medications but Jacquelyn indicated he is not doing it well and is now going to assist him.   Driving:  He stated he is currently restricted from driving.   ADL:  He manages his self cares independently.   Leisure: Vamshi appeared unsure of leisure activity but when Jacquelyn reminded him, he stated he has a cat.     Pain: Vamshi indicated he has chronic back pain.     OBJECTIVE  Results of assessments are as follows:    ACL: 4.6 /5.8  CPT: 5.0 /5.6   MOCA: 22 /30    The above assessment scores indicate a Mild to Moderate   level of impairment.     ASSESSMENT  Recommendations:  Cognitive functioning recommendations: 4.6-4.8: The assessment scores indicate a recommendation for an assisted living environment with daily check-in supervision (i.e. at home with assistance or assisted living facility). Assistance with managing medications and finances is recommended as Vamshi may have increased difficulty with complex problem solving. Learning new skills and information may be very challenging for Manjeet but he may be able to do so with a lot of repetition. Problem solving is often challenging especially when it is not anticipated or expected. Manjeet may lack insight into his deficits indicating an increased need for assistance with complex tasks requiring accuracy for safety. Supervision is recommended for Manjeet when using hot tools and major appliances during meal preparation. Driving is not recommended for Manjeet due to difficulty with divided attention and complex problem solving. Further, Manjeet may benefit from increased structure throughout the day creating healthy habits and routines and eliminating his need to problem solve from one task to another.    Following the evaluation, Jacquelyn indicated that they were looking into the safest environment for Vamshi and per this assessment, assisted living would be appropriate as noted above. He would greatly benefit from daily supervision, provision of meals and assistance with all IADL's.     Thank you for this referral.  If I can be of further assistance, please do not hesitate to contact me.    MEDICARE PATIENT: Yes: HCIN #[id number]; Provider # ; Certification Dates: from 8-21-17 to 8-21-17    Physician Recommendation:  1. I certify the need for these services furnished within this plan and while under my care. I agree with the therapist's recommendation for plan of care.  2. If there is any recommendation for modification of therapy plan, please indicate below.    Winsome Nolan,  OT

## 2021-06-12 NOTE — PROGRESS NOTES
[facility] Follow Up Note    Manjeet Oshea   82 y.o. male    Date of Visit: 8/7/2017    Chief Complaint   Patient presents with     Follow-up     Subjective  This is an 82-year-old man who comes in for 2 reasons.  The first is some memory issues.  I received a phone message from 1 of his daughters on Friday of last week regarding an incident in which she was driving 1 of his daughters with cerebral palsy back to her residence one evening and apparently drove throughout the entire night not being able to find where he was going.  Subsequent to that he ended up in the emergency room at [facility].  I have had an opportunity to review the notes from that emergency department visit.  The patient himself is a little vague about the incident other than remembering that he drove all night.  Since then his daughter his strongly advised him against driving and he is avoiding driving.  He was driven to this appointment today.  It is clear that his ability to give an accurate history in this area is a little bit faulty.  The second issue is worsening back pain.  He is hoping that he could see a spine specialist at this point in time.  He offers no other particular physical complaints and there have been no recent changes in medication.    ROS A comprehensive review of systems was performed and was otherwise negative    Medications, allergies, and problem list were reviewed and updated    Exam  General Appearance:   On examination his blood pressure is 110/60.  Weight is 155 pounds and height is 66 inches.  BMI is 25.02.    Heart is in a stable rhythm with rate of 82 and no ectopy.    Good motion in all extremities.    Gait is normal with his cane.    The patient seems alert and oriented ×3 today.  As noted above, however, his memory of the recent issue and events around is somewhat vague.      Assessment/Plan  1. Memory loss  Ambulatory referral to Dementia/Memory Loss Clinic   2. Back pain   Ambulatory referral to Spine Care     Clear worsening of memory issues.  I did discuss this with him.  I also strongly encouraged him not to be driving until we have better resolution of this problem.  I advised that we should have him seen at the memory care clinic at [place].  He is agreeable to this and so we will set up a consultation.    Chronic back pain.  This seems to be worsening and so I think a visit to spine care would be in order.  We will set up that consultation as well.    I will follow-up with him after these consultations.  I did inform him that I would certainly be happy to talk with his daughter if she would like to do that.    Total time of this office visit was 25 minutes with greater than 50% of the time spent in care coordination and patient counseling.      Colby Martines MD      Current Outpatient Prescriptions on File Prior to Visit   Medication Sig     acetaminophen (TYLENOL) 325 MG tablet Take by mouth as needed for pain.     aspirin 81 MG EC tablet Take 81 mg by mouth daily.     calcium citrate-vitamin D (CITRACAL+D) 315-200 mg-unit per tablet Take 1 tablet by mouth daily.     cholecalciferol, vitamin D3, (VITAMIN D3) 2,000 unit cap Take 1 capsule by mouth daily.     clopidogrel (PLAVIX) 75 mg tablet Take 1 tablet by mouth  daily     metoprolol (TOPROL-XL) 100 MG 24 hr tablet Take 100 mg by mouth daily.     MULTIVIT &MINERALS/FERROUS FUM (MULTI VITAMIN ORAL) Take 1 tablet by mouth.     omeprazole (PRILOSEC) 20 MG capsule Take 1 capsule by mouth  daily     simvastatin (ZOCOR) 40 MG tablet Take 40 mg by mouth bedtime.     traMADol (ULTRAM) 50 mg tablet Take 1 tablet (50 mg total) by mouth every 6 (six) hours as needed for pain.     No current facility-administered medications on file prior to visit.      No Known Allergies  Social History   Substance Use Topics     Smoking status: Former Smoker     Smokeless tobacco: Never Used     Alcohol use None

## 2021-06-12 NOTE — PROGRESS NOTES
.OUT PATIENT CLINICAL SOCIAL WORK: PSYCHOSOCIAL ASSESSMENT      Manjeet Oshea   11/17/1934 8/14/2017    Primary Language: English  Referral Source: Dr. Martines PCP  Person(s) Present at Visit: Pt, and Pt neighbor, Jacquelyn.   Goal(s) for Visit: First Consultation  Presenting Concerns: Pt has recently had issues driving and getting lost.  Pt had a recent event where he drove around from 8pm to 6am, unable to find his destination.    Cognitive: Pt. Has been having issues with short term memory, forgetting to take his medication, locking himself out of the house, getting confused and losing direction while driving.   Behavioral: no current concerns.      Manjeet Oshea is a 82 y.o. male with cognitive concerns.  Reji, preferred to be called Bill, was neatly groomed and dressed for visit.  Pt was seated for assessment.  Pt was for the most part unable to provide answer to assessment questions and relied heavily on the neighbor's input, or the neighbor had to get a hold of the daughter for the answers to the questions via text message.  Concerned daughter, Gracie, currently lives out of state.  Pt presented with somewhat flat/ depressed affect and soft spoken voice.  Pt was unable to recall much of his medical history (surgeries or diagnosis) and even unable to recall the diagnosis of his adult children's condition which restricts them to wheelchairs.        PRIMARY DECISION MAKER FOR HEALTHCARE  Patient  Copy of legal documents on chart? NA      Additional Information: daughter Gracie Oshea helps with health care. Lives out of state in [place].  [phone].      PATIENT REPRESENTATIVE  Same as above      Primary Decision Maker for Healthcare provides verbal authorization for this clinic to have care coordination conversations with the above contacts as needed: Yes    HEALTHCARE DIRECTIVE/LEGAL ISSUES  Pt has healthcare directive: No      If yes, copy of documents on chart? NA  n/a    Additional Information: [facility]  "paperwork provided at the appointment for family to complete and return.     FINANCIAL INFORMATION  Primary Funding Source: Medicare A & B  Secondary Funding Source: [facility] [employer]/ [employer]    Additional Financial Information:    Financial POA: Yes - who: Gracie Oshea    SSI / SSDI: No     Social Security: Yes    [place]: No      LTC Insurance: no    Medical Assistance (MA) Status:     N/A- resources given to family to see if pt would qualify for waiver programming.     County of Residence: [facility] [facility]    Waiver Services: No  type:n/a      OCCUPATION / EDUCATION:  Current Employment: None/Retired  Prior Occupation(s): [employer]: [employer]    MEDICAL:  Please see  Dr. Bonilla's consultation from 08/14/2017 for complete medical history.  Community MD/Clinic: [facility]       Additional Information/Concerns: n/a    CHEMICAL HEALTH:  Current Tobacco Use: None  Prior Tobacco Use: Yes Pt smoked for 1 yr in his 40's.  1/2 pack per day.        Current Alcohol/Drug Use: Yes 0-1 drinks/ day        Treatment: None  Prior Alcohol/Drug Use: Yes 0-1 drinks/ day         PSYCHIATRIC / BEHAVIORAL:  Current Dx: None    HOME / LIVING ENVIRONMENT:  Patient lives: Alone  Home Accessibility Concerns: None  Additional Information/Concerns: Pt lives in a 1 story town home w/ ramp.     COMMUNITY / SOCIAL SUPPORT:   Community services: pt does have house cleaning service 2x per month        FAMILY SUPPORT:  Relationship Status:   Children: 03      DAILY ROUTINE:  Sleep- Pt reports getting adequate sleep  Nutrition- Pt reports eating adequately- he orders and gets delivered [employer] meals.  Activities-no exercise  Hobbies-planes- watching/ studying.  Pt volunteers at a [employer].       FUNCTIONAL STATUS:  Is patient driving? Yes   Additional Information: Pt had recent incident of getting lost all night.  Has been referred by PCP to be \"cleared\" to drive.   Is patient independent with the following " activities? Bathing, Dressing, Grooming, Toileting, Eating, Mobility, Home Making, Medication Management, Meal Preparation, Shopping and Financial Management  Additional Information/Concerns: Pt states that he often burns food, as he forgets he placed it in the oven.  Pt states neighbor takes him to run errands.  Neighbor reports that Pt financial circumstances are not good, maxed out credit cards and a second mortgage.       PRIOR COGNITIVE TESTING / IMAGING: n/a    INTERVENTION(S):  Assessment and Resources  Additional Information: Pt was given new patient memory loss clinic folder.  Pt was given info on HCD and [employer] [facility]CD.  Pt was given information on [facility] for waiver programming.     PATIENT/FAMILY OBSERVATIONS:  Patient: Pt seemed to think that everything was going ok.  He stated, in regard to his getting lost while driving, that he can get to where he needs to go when he wants to.  He often did not know the answers to assessment questions, or relied on the neighbor to aide him in answering.    Family/Caregiver: His daughter has concerns about his memory, medication management, eating/ hydrating, ability to live independently and drive, etc.      PLAN/FOLLOW-UP:  Pt and family will follow up with SW as needed at next appointment or over the phone.      Eli Queen, DERREK, Master's Level Social Work Intern  08/14/2017  03:00PM        I have read, discussed and agree with the documentation as presented by Eli Queen.  Krystal Brenner, [facility]  08/14/2017

## 2021-06-12 NOTE — PROGRESS NOTES
"[facility] Outpatient Consultation    Assessment / Impression:   1.  Delirium, likely multifactorial in etiology  2.  Dementia of uncertain etiology  3.  Cerebrovascular disease, status post CVA in the recent past  4.  Depression NOS  5.  History of skin cancer  6.  Degenerative arthritis  7.  ASCVD  8.  Status post bilateral femur fractures presumed secondary to osteoporosis    Plan:   1.  No driving until diagnostic evaluation completed  2.  EEG  3.  Repeat MRI brain  4.  Screening blood tests  5.  OT evaluation  6.  Review all pertinent recent medical records including imaging studies of the brain    This 82-year-old male with above-noted past medical history has a greater than 18-24 month history of progressive changes in cognition most notably manifested by impairment in short-term recall beginning within the past 2 years.  These problems have led to difficulties with medication compliance and financial management.  Over the past year the patient has had several instances where he has become lost while driving or where he has had difficulty navigating his car to a familiar location.  Several weeks ago he apparently drove all night believing that he was taking his handicap daughter to a birthday party.  When found in the morning by police he appeared confused and because of this was taken to a local hospital for evaluation.  Another recent event involved the patient being found confused by a pedestrian while attempting to drive \"[place].\"  Again police intervened with a medical evaluation being undertaken.  While I do not have results of these evaluations it would appear that the patient has significant acquired cognitive deficits that may be fluctuating in their presentation.  Because of this I believe the above-noted evaluation is warranted.    I have explained my findings to the patient and daughter who is present by phone.  All questions answered.  Total time for evaluation one hour with majority " "time spent in counseling.    Subjective:   HPI: Manjeet Oshea is a 82 y.o. male with above-noted past medical history presents for a \"driving evaluation.\"  I remember the patient from my time in [facility].  Mr. Oshea is a retired  who is currently living alone in his own home in [place], at home he i has occupied for 25 years.  Suffering from both coronary artery disease and cerebrovascular disease, he has been noted by family members to be exhibiting progressive changes in cognition and functionality noted over the past 2 years but accelerating in the past 3-4 months.  It was in June that he was hospitalized for a small CVA.  Initially manifesting difficulties with short-term memory, there have been progressive changes in functionality accompanied by what appeared to be periods of more remarkable confusion.  The patient is declining in terms of personal hygiene and in the orderliness of his home has deteriorated to some extent.  There have been difficulties with finances and with medication management.  Superimposed upon these difficulties have been episodes of what would likely be best described as confusion that have resulted in the patient becoming lost multiple times while driving his car.  A more serious incident occurred several weeks ago when the patient apparently drove about town all night until being found by police the next morning.  The police apparently found him to be confused; he reported to the police that he was attempting to find the location of a birthday party he was driving his daughter to.  His daughter was not present in the car and had not asked her father to drive her anywhere.  Another recent incident involved the patient attempting to \"drive up north\" only to be once again diverted by the police to a local hospital for evaluation after he approached a pedestrian in a confused manner asking directions.    At this time I do not have medical records for review " including records from the above-noted incidents.  The patient's primary physician wish the patient to present to this clinic in order to be evaluated to determine whether he was safe to operate a motor vehicle.    The patient presents today as a somewhat disheveled appearing elderly male who demonstrates no evidence of acute physical distress.  The patient did not recognize this examiner despite the fact that I was his primary care physician for many years.  He was unable to articulate the reason for his presentation today.  When I inquired about the device hanging from his body (a portable cardiac monitor) he was unable to tell me why in fact he was wearing this device as well as who had placed it.  Of note, during the course of evaluation, I noted what appeared to be variability in the patient's attentional function.  At times he appeared reasonably attentive but others identifiably confused with somewhat incoherent speech.  He denies somatic complaints at this time including headaches, difficulty with vision shortness of breath chest pain abdominal pain fevers chills sweats.  He reports sleeping well at night.  Specifically he denies delusions or hallucinations tremors falls loss of consciousness.  Jacquelyn, a neighbor who accompanies the patient today (the patient's daughter Gracie was present by phone) reports that she has known mouna for approximately 2 years and has witnessed progressive decline in cognition and functionality during this time.  She also reports significant variability in Bill's cognitive capabilities as well as decline in personal cares.    Past Medical History:   As above    Social History:   Born in [place], [place], the patient completed a high school education as well as a college degree at an associate of arts level.  He did work for the [employer].   for an unspecified period of time, he suffered the loss of his wife in March 2016.  The spouse's death was accompanied by a period of  grief and/or depression.  Patient currently resides in a town home that he is occupied in [place] for the past 25 years.    Past Psychiatric History:   The patient has no long-standing history of psychiatric illness.      Family History:   There is a history of dementia in the patient's mother.  There is also a history of cerebrovascular disease in the family    Review of Systems:  Pertinent items are noted in HPI.    Objective:   The patient is a disheveled elderly male who appears to be in no acute physical distress.    Vitals:    08/14/17 1437 08/14/17 1438 08/14/17 1439   BP: 115/61 105/57 115/60   Pulse: 77 83 84   Weight: 152 lb (68.9 kg)       Physical Exam:    Brief exam was performed.  Skin exam reveals a few scattered senile ecchymoses.  Skin is generally thin and dry with borderline turgor.  Extremities are cool to palpation.  HEENT exam reveals head to be normocephalic.  Eyes with conjunctiva clear.  Ears nose normal inspection oropharynx is clear with no retained oral secretions.  Neck supple without adenopathy.  Lungs are clear to auscultation with normal work of breathing.  Examination of the chest reveals the cardiac telemetry monitoring place.  Cardiac exam reveals a regular S1-S2 without third or fourth heart tones.  Abdomen soft nontender.  Extremities with peripheral joint changes consistent with degenerative joint disease but no joint redness swelling or tenderness.    Neurological Exam:     Brief exam was performed.  Cranial nerve exam reveals conjugate gaze with normal pursuit movements.  Some slight restriction in upgaze on EOM testing noted.  No breakdown of saccadic movements noted.  No evidence of square wave jerks or ocular apraxia.  Motor tone appears to be slightly reduced throughout.  Reflexes are diminished.  Frontal release signs include a borderline glabellar response.  Station reveals a widened base with forward flexed posture.  A dorsal kyphosis was identified.  Postural reflexes  are adequate.  Ambulating with the aid of a cane, I note no clear evidence of ataxia or focal weakness.  No evidence of gait apraxia.    Cognitive Evaluation:     The patient has the appearance as outlined above.  Variability in attentional function was perceived during the course of exam.  At times the patient's conversation and responds to questions were vague and a bit disorganized.  At other times a good degree of coherency was noted.  Insight appears to be only partially preserved.  Attentional function, when tested, appeared to be reasonably good.  The patient was able to sustain during performance of tests.  No difficulty with cognitive set shifting was noted on a graphomotor sequencing test or when performing a variety of working memory test.  Working memory tests were completed without difficulty including naming months of the year and days of the week backwards.  Clear evidence of short-term memory impairments were identified.  The patient stated the day of the week is Friday but otherwise was able to name correctly the date month and year.  He seemed to have great difficulty recalling events in his recent past as outlined above.  He demonstrated significant dysfluency generating only 7 exemplars during both lexical and categorical fluency testing.  No evidence of ideomotor or constructional apraxia.  No perseveration noted on a loop test.  Affect was pleasant mood difficult to assess at this time.  The patient did not appear to be overly concerned regarding the nature of this evaluation.  I noted no abnormal thought content or form, unusual ideation or unusual or stereotyped motor behaviors.  No significant anxiety noted.  No true obsessions or compulsions.    Medications:  Scheduled Meds:  Continuous Infusions:  PRN Meds:.    Uziel Bonilla MD  8/14/2017

## 2021-06-12 NOTE — PROGRESS NOTES
Persons accompanying you (the patient) today: Neighbor: Jacquelyn    How have you been doing since we saw you last? Please note any concerns.  Pt. Has no concerns    Please list any surgeries or procedures you have had since we saw you last:  Mini stroke in June.    Have you had any falls since your last visit? No    Do you have any pain today? Yes: Lower back pain.    With whom do you currently reside? (alone, spouse, family, assisted living, nursing home)  Pt. Lives in a town home.

## 2021-06-13 NOTE — PROGRESS NOTES
Assessment / Plan:     Diagnoses and all orders for this visit:    Low back pain  -     DXA Bone Density Scan; Future; Expected date: 9/28/17  -     Vitamin D, Total (25-Hydroxy); Future  -     Ambulatory referral to Osteoporosis Care    Lumbalgia  -     Ambulatory referral to Osteoporosis Care    Thoracic back pain  -     DXA Bone Density Scan; Future; Expected date: 9/28/17  -     Vitamin D, Total (25-Hydroxy); Future  -     Ambulatory referral to Osteoporosis Care    Myofascial pain  -     Ambulatory referral to Osteoporosis [facility]    Sleep disturbance  -     Ambulatory referral to Osteoporosis Care    Osteoporosis  -     DXA Bone Density Scan; Future; Expected date: 9/28/17  -     Vitamin D, Total (25-Hydroxy); Future          Manjeet Oshea is a 82 y.o. y.o. male with past medical history significant for coronary artery disease, hypercholesterolemia, severe reflux disease, osteoporosis, who presents today for follow-up regarding chronic thoracic, and lumbar pain.  Patient was found to have acute to subacute L2 superior endplates, and left L4 vertebral body fracture on the MRI that was done on August 29, 2017.  Patient was wearing his TLSO and start weaning off and has started on physical therapy.  He reports mild improvement in his symptoms.  Follow-up x-rays that were done on September 26th 2017 shows stable L1, L2 vertebral body height loss.  There is minimal loss of vertebral body height loss at the L4 and its stable.  Patient was diagnosed with osteoporosis, however,  no recent history of DEXA scan, or vitamin D3 levels.  I ordered a DEXA scan, and D3 levels, and I would like patient to follow-up with osteoporosis specialist due to his ongoing pain, and low bone density.  Patient will follow-up with me after his consult with the osteoporosis specialist.    A shared decision making plan was used.  The patient's values and choices were respected.  The following represents what was discussed and decided upon  by the physician and the patient.      1.  DIAGNOSTIC TESTS: I reviewed the lumbar MRI imaging and the report with the patient today.  He verbalizes understanding.  2.  PHYSICAL THERAPY: Patient will continue on physical therapy program.  3.  MEDICATIONS: Patient will continue on tizanidine 2 mg as needed for muscle spasm.  4.  INTERVENTIONS: No therapeutic injections at this time.  5.  PATIENT EDUCATION: I thoroughly discussed the plan with the patient today.  He verbalizes understanding and agrees with the plan.  6.  FOLLOW-UP: Patient will follow up with me after his consult with osteoporosis specialist..  All questions were answered.      JADYN Wright, MPA-C      Subjective:     Manjeet Oshea is a 82 y.o. male who presents today for follow-up regarding thoracic, and low back pain.  Today patient reports mild improvement in his symptoms with applying ice, and rates it at 2 out of 10 intensity on its best.  Today patient reports 2 out of 10 intensity pain in the mid to lower back.  His symptoms are aggravated by walking.  His lumbar MRI showed acute to subacute L2 superior endplate and left L4 vertebral body inferior endplate fracture.  There was no significant retropulsion of the fractures fragments.  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  Today patient returns to review the lumbar x-rays, to follow-up on his fractures.  The lumbar x-rays done on September 26 of 2017 shows loss of the vertebral body height at the L1 however appears grossly stable.  There is stable mild loss of vertebral body height at the L3.  There is minimal loss of vertebral body heights at the L4 and it is stable.  There is diffuse osteopenia.  Patient states that he was diagnosed with osteoporosis in the past.  He does not recall when was his last DEXA scan.  He admits for taking calcium and vitamin D3.  He does not recall when was last lab work for  evaluating the D3 levels.  Today he continues to complain generalized weakness and pain in his lower back that had slightly improved since his last visit.Patient denies urinary or bowel incontinence, unintentional weight loss, saddle anesthesia, fever or chills, balance difficulties.    Review of Systems:  Mid to low back pain. Patient denies urinary or bowel incontinence, unintentional weight loss, saddle anesthesia, fever or chills, balance difficulties.       Objective:   CONSTITUTIONAL:  Vital signs as above.  No acute distress.  The patient is well nourished and well groomed.    PSYCHIATRIC:  The patient is awake, alert, oriented to person, place and time.  The patient is answering questions appropriately with clear speech.  Normal affect.  SKIN:  Skin over the face, posterior torso, bilateral upper and lower extremities is clean, dry, intact without rashes.  MUSCULOSKELETAL:  Gait is non-antalgic.  The patient is able to heel and toe walk without any difficulty.  Mild tenderness over the L4-L5, L5-S1 lumbar paraspinal muscles.      The patient has 5/5 strength for the bilateral hip flexors, knee flexors/extensors, ankle dorsiflexors/plantar flexors, ankle evertors/invertors.    NEUROLOGICAL:  1/4 patellar, medial hamstring, achilles reflexes which are symmetric bilaterally.  No ankle clonus bilaterally.  Sensation to light touch is intact in the bilateral L4, L5, and S1 dermatomes.      Negative straight leg raise bilaterally.  Unable to assess Baldev test due to pain.  Negative hip impingement bilaterally.     RESULTS:    XR LUMBAR SPINE 2 OR 3 VIEWS  09/26/2017, 2:54 PM     INDICATION: Low back pain.  COMPARISON: Lumbar spine MRI dated 08/29/2017.     FINDINGS: Five lumbar type vertebrae. Rightward curvature to the lower thoracic and upper lumbar spine. Mildly exaggerated lumbar lordosis. Mild retrolisthesis of L2 on L3. Minimal retrolisthesis of L3 on L4. Loss of vertebral body height at L1 appears    grossly stable. Slight interval progression of vertebral body height loss at L2. Stable mild loss of vertebral body height at L3. Minimal loss of vertebral body height at L4 is grossly stable, as well. Diffuse osteopenia. Mild L4-L5 and moderate L5-S1   degenerative disc disease. Multilevel facet arthropathy is most pronounced in the lower lumbar spine. 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 Sternotomy wires. Partially visualized orthopedic hardware in the right femur.   Remainder negative.      [facility]  MR LUMBAR SPINE WO CONTRAST  8/29/2017 12:04 PM      INDICATION: Thoracic, lumbar pain.  TECHNIQUE: Routine.  CONTRAST: None  COMPARISON: Prior lumbar spine MRI examinations, last performed 08/16/2016.     FINDINGS: Nomenclature is based on 5 lumbar type vertebral bodies. Dextroconvex curvature apex at L1-L2, with stable mild 3 mm retrolisthesis of L3 on L4. Focal segmental kyphosis is partially imaged, related to chronic multilevel vertebral body   insufficiency fractures spanning the levels of T11 through L3, measuring approximately 20 degrees as measured from the superior endplate of T12 to the inferior endplate of L3. Newly appreciated infiltrative edematous marrow changes are associated with   new L2 superior endplate compression fracture, with approximately 50% height loss. Edema extends along both pedicles, with minimal retropulsion of fracture fragments into the ventral spinal canal. Newly appreciated geographic and infiltrative low T1 and   bright T2 marrow signal intensity is associated with the left aspect of the L4 vertebral body, additional suspected fracture involving the left aspect of the vertebral body and inferior endplate. No pars defect. The conus tip is identified at the level   of L1-L2 interspace. Paraspinal muscle atrophy is commensurate with age, with noted chronic postsurgical changes related to remote L3-L4 through L5-S1 posterior decompression.  No abdominal aortic aneurysm. Fixation hardware is associated with the   bilateral hips.     T12-L1: Mild degenerative disc disease in association with L1 vertebral body compression fracture, with mild circumferential disc bulge. Mild to moderate bilateral facet arthropathy. No spinal canal stenosis. No right neural foraminal stenosis. No left   neural foraminal stenosis.     L1-L2: Mild intervertebral disc height loss with relative preservation of T2 signal intensity. Minimal circumferential disc bulge. Mild to moderate bilateral facet arthropathy. No spinal canal stenosis. No right neural foraminal stenosis. No left neural   foraminal stenosis.     L2-L3: Relative preservation of intervertebral disc height with loss of T2 signal intensity. No significant disc herniation. Mild bilateral facet arthropathy. No spinal canal stenosis. Mild right neural foraminal stenosis. Mild to moderate left neural   foraminal stenosis.     L3-L4: Mild to moderate intervertebral disc height and T2 signal loss, with circumferential disc bulge and dorsal disc uncovering related to retrolisthesis of L3 on L4. Moderate to advanced bilateral facet arthropathy. Mild spinal canal and moderate   right lateral recess stenosis status post decompression, stable. Moderate right neural foraminal stenosis. Moderate left neural foraminal stenosis.     L4-L5: Mild intervertebral height loss, with circumferential disc osteophyte complex. Moderate right and moderate to severe left facet arthropathy. Adequate spinal canal patency status post decompression. Moderate right neural foraminal stenosis.   Moderate left neural foraminal stenosis.     L5-S1: Moderate to advanced intervertebral disc height and T2 signal loss, with circumferential disc osteophyte complex. Mild to moderate bilateral facet arthropathy. Adequate spinal canal patency status post decompression, with stable encroachment on   the traversing left S1 nerve root. Mild right neural  foraminal stenosis. Mild left neural foraminal stenosis.     IMPRESSION:   CONCLUSION:  1.  Interval development of acute to subacute L2 superior endplate and left L4 vertebral body/ inferior endplate fractures. No significant retropulsion of fracture fragments into the ventral spinal canal.   2.  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  3.  Stable background of spondylitic changes, with adequate spinal canal patency status post L3-L4 through L5-S1 posterior decompression.

## 2021-06-13 NOTE — PROGRESS NOTES
.OUT PATIENT-CLINICAL SOCIAL WORK PROGRESS NOTE      9/22/2017           Manjeet Oshea is a 82 y.o. male with  dementia per medical record.  She was accompanied by his daughter Gracie from [place].  She stated that she is in the process of obtaining financial power of  and that a healthcare directive has been completed and it will be faxed over to the clinic.  Also noted that Gracie is in contact with [employer], a company that will assist in finding alternate placement for her father.      ASSESSMENT: Patient appeared pleasant cooperative today.      PLAN: Provided patient and family with additional information on what to look for in an assisted living search.    Type of Service: Office Visit    Services Provided: Resources    Resources Provided: Community Programs     Krystal Brenner [facility]   9/22/17  1100

## 2021-06-13 NOTE — PROGRESS NOTES
Assessment / Impression   1.  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  Possible delirium      Plan:   1.  Continue present therapies although an ongoing conversation should be had with respect to ongoing use of aspirin and Plavix  2.  I moved to a senior campus to receive assisted living level support would seem appropriate    Conversation held with the patient's daughter Gracie inpatient.  I did have a conversation with the cardiologist regarding the patient's noted ischemic events on MRI.  Echocardiogram reveals no evidence of an atrial source for such events and carotid ultrasound was by report negative.  At this point in time we have agreed that pharmacotherapy is most appropriate along with wellness management.  In addition, increased environmental support would be appropriate.    Total time for this evaluation 30 minutes with majority time spent in counseling.      Subjective:     HPI: Manjeet Oshea is a 82 y.o. male with above-noted diagnoses who returns for follow-up.  The patient is accompanied by his daughter Gracie who lives in [place] and who will be returning home tomorrow.  I did have a conversation with the patient's cardiologist regarding findings on MRI of the brain and we both agreed that further diagnostic intervention was likely unhelpful beyond an echocardiogram and carotid ultrasound.  Both of these tests did not reveal any other source for emboli and therefore I suspect that findings on MRI relate to small vessel thrombosis.    The patient is feeling well today offers no complaints.          Review of Systems:          As noted        Objective:     Vitals:    09/28/17 1212   BP: 115/57   Pulse: 95   Weight: 152 lb (68.9 kg)       Recent Results (from the past 24 hour(s))   Plavix Resistance Assay   Result Value Ref Range    P2Y12 Function Time 65 (L) 180 - 376 PRU       Physical Exam: As noted previously.

## 2021-06-13 NOTE — PROGRESS NOTES
Persons accompanying you (the patient) today: Daughter Gracie    How have you been doing since we saw you last? Please note any concerns.  F/u apt.     Please list any surgeries or procedures you have had since we saw you last:  None    Have you had any falls since your last visit? No    Do you have any pain today? No    With whom do you currently reside? (alone, spouse, family, assisted living, nursing home)  Pt lives alone in [facility]

## 2021-06-13 NOTE — PROGRESS NOTES
Persons accompanying you (the patient) today: Gracie ( Daughter)     How have you been doing since we saw you last? Please note any concerns.  No concerns    Please list any surgeries or procedures you have had since we saw you last:  none    Have you had any falls since your last visit? No    Do you have any pain today? No    With whom do you currently reside? (alone, spouse, family, assisted living, nursing home)  Pt live alone in a town home

## 2021-06-14 NOTE — PROGRESS NOTES
Patient came in today for a flu shot, zoster shot and TDAP. Patient did have Jaleesa with him. Daughter is aware. Patient is not able to get injections here as Medicare will not cover TDAP and zoster. Did advise patient to go to pharmacy to receive these injections as per Dr. Martines's advice. Azul Velazquez LPN

## 2021-06-15 NOTE — PROGRESS NOTES
Persons accompanying you (the patient) today: quinten erwin friend of daughters    How have you been doing since we saw you last? Please note any concerns.  Doing fine    Please list any recent hospitalizations/surgeries/procedures you've had since we saw you last:  In hospital 2months ago    Have you had any falls since your last visit? No    Do you have any pain today? No

## 2021-06-15 NOTE — PROGRESS NOTES
Assessment / Impression   1.  Vascular dementia  2.  Cardiovascular disease  3.  Cerebrovascular disease  4.  Anxiety and depression NOS      Plan:   1.  Trial Lexapro 5 mg p.o. daily ×7 days then 10 mg p.o. daily thereafter.  Lexapro has been selected as this has been an effective therapy for the daughters anxiety and depression  2.  Continue environmental support    A long conversation with the patient daughter Gracie and female friend who accompanies the patient today.  The patient has been demonstrating an increased degree of negativity as well as anxiety regarding both identifiable concerns as well as concerns that are difficult to identify by both the patient family and caregivers.  I did have a long conversation with the family in patient regarding the theoretical risk of additional blood thinning properties of SSRI antidepressants but this is likely mitigated by the patient's current use of antiplatelet therapies.  Staff and family to notify me with any increase in tendency for bleeding with this treatment otherwise will plan to follow-up in 1 month.    Total time for evaluation 30 minutes with the majority of time spent in counseling and care coordination.  All questions answered.      Subjective:     HPI: Manjeet Oshea is a 83 y.o. male with above-noted diagnoses who returns for follow-up.  The patient was moved to [facility] occupying an independent and/or assisted living apartment initially.  The patient quickly demonstrated the need for increased support as he wanted away from the facility on one occasion and found it difficult to find his way back.  There have been other instances were confusion seemed to significantly impair the patient's ability to be managed within this environment.  Because of this, in October the patient was moved to a secure unit where he occupies a single room in this facility.    The patient generally has been doing well however the daughter notes that he has a degree  of anxiety related to concerns that are difficult for him to articulate.  At other times he seems to confabulate concerns that heightened his anxiety.  During the course of conversation the daughter notes some degree of negativity and expressions of depression.    The patient appears to be doing very well today although he continues to demonstrate cognitive impairments.  He states that he is not experiencing significant depression or anxiety at this time.          Review of Systems:          As noted        Objective:     Vitals:    01/04/18 1124 01/04/18 1125 01/04/18 1126   BP: 112/56 120/58 118/55   Pulse: 84 87 83   Weight: 160 lb (72.6 kg)         No results found for this or any previous visit (from the past 24 hour(s)).    Physical Exam: As noted previously.  Patient is neatly groomed casually dressed and seated for evaluation.  Forward flexion is noted.  He does make eye contact.  He engages in very little spontaneous conversation.  Responses to questions are generally devoid of content but socially appropriate.  No overt evidence of confusion.

## 2021-06-16 PROBLEM — F03.90 DEMENTIA (H): Status: ACTIVE | Noted: 2017-11-05

## 2021-06-16 PROBLEM — R33.9 URINARY RETENTION: Status: ACTIVE | Noted: 2019-09-03

## 2021-06-16 PROBLEM — K92.2 GI BLEED: Status: ACTIVE | Noted: 2019-09-01

## 2021-06-16 PROBLEM — I48.0 PAROXYSMAL ATRIAL FIBRILLATION (H): Status: ACTIVE | Noted: 2019-09-04

## 2021-06-16 NOTE — PROGRESS NOTES
Dear Dr. Martines ,  Your patient, Manjeet Oshea was seen today for management of osteoporosis.  The last bone density scan was done on 10/11/2017:  1. The spine bone density L3-L4 with T-score -0.9, stable compared to 2007.  2. Right forearm bone density with T-score -3.5.  3. Trabecular bone score indicates poor trabecular bone architecture.    Patient was treated in the past with Fosamax for > 5 years. Treatment stopped 5-10 years ago, because of concern with possible side effects.    Social history:  reports that he has quit smoking. He has never used smokeless tobacco. He reports that he does not drink alcohol or use illicit drugs.    Past medical history:   Patient Active Problem List   Diagnosis     Coronary Artery Disease     Hemorrhoids     Hypercholesterolemia     Esophageal Reflux     Upper Respiratory Infection     Osteoporosis     Cellulitis     Backache     Chest Pain     Acute Sinusitis     Chronic Sinusitis     Cellulitis Of The Left Leg     Limb Pain     Diarrhea     Urinary Frequency More Than Twice At Night (Nocturia)     Dementia     Coronary artery disease involving native coronary artery of native heart without angina pectoris     Dyslipidemia     History of fractures: 2 vertebral Fx - L2 and L4 6 months ago, h/o hip fracture many years ago    FH: mother had osteoporosis, daughter has osteopenia  Diet and supplements: Ca 315 mg daily, MVi daily, 1-2 cup of milk daily.    Risk medications: none    Laboratory testing:   Component      Latest Ref Rng & Units 9/28/2017 10/24/2017 11/5/2017   Sodium      136 - 145 mmol/L  140 138   Potassium      3.5 - 5.0 mmol/L  4.4 3.8   Chloride      98 - 107 mmol/L  105 105   CO2      22 - 31 mmol/L  25 24   Anion Gap, Calculation      5 - 18 mmol/L  10 9   Glucose      70 - 125 mg/dL  95 90   BUN      8 - 28 mg/dL  19 18   Creatinine      0.70 - 1.30 mg/dL  1.11 0.96   GFR MDRD Af Amer      >60 mL/min/1.73m2  >60 >60   GFR MDRD Non Af Amer      >60  mL/min/1.73m2  >60 >60   Bilirubin, Total      0.0 - 1.0 mg/dL  0.5    Calcium      8.5 - 10.5 mg/dL  9.6 9.4   Protein, Total      6.0 - 8.0 g/dL  7.1    ALBUMIN      3.5 - 5.0 g/dL  3.7    Alkaline Phosphatase      45 - 120 U/L  102    AST      0 - 40 U/L  30    ALT      0 - 45 U/L  23    Color, UA      Colorless, Yellow, Straw, Light Yellow  Yellow    Clarity, UA      Clear  Clear    Glucose, UA      Negative  Negative    Bilirubin, UA      Negative  Negative    Ketones, UA      Negative  Negative    Specific [place], UA      1.001 - 1.030  1.020    Blood, UA      Negative  Negative    pH, UA      4.5 - 8.0  6.0    Protein, UA      Negative mg/dL  Negative    Urobilinogen, UA      <2.0 E.U./dL, 2.0 E.U./dL  <2.0 E.U./dL    Nitrite, UA      Negative  Negative    Leukocytes, UA      Negative  Negative    WBC      4.0 - 11.0 thou/uL   6.6   RBC      4.40 - 6.20 mill/uL   4.25 (L)   Hemoglobin      14.0 - 18.0 g/dL   13.1 (L)   Hematocrit      40.0 - 54.0 %   38.4 (L)   MCV      80 - 100 fL   90   MCH      27.0 - 34.0 pg   30.8   MCHC      32.0 - 36.0 g/dL   34.1   RDW      11.0 - 14.5 %   12.8   Platelets      140 - 440 thou/uL   231   MPV      8.5 - 12.5 fL   9.1   Vitamin D, Total (25-Hydroxy)      30.0 - 80.0 ng/mL 46.1         ROS:     Constitutional: Negative.    HENT: Negative.    Eyes: Negative.    Respiratory: Negative.    Cardiovascular: Negative.    Gastrointestinal: Negative.    Endocrine: Negative.    Genitourinary: Negative.    Musculoskeletal: Negative.    Skin: Negative.    Allergic/Immunologic: Negative.    Neurological: Negative.    Hematological: Negative.    Psychiatric/Behavioral: Negative.      PE:  /70  Pulse 70  Wt 166 lb 8 oz (75.5 kg)  BMI 24.59 kg/m2  Constitutional:  oriented to person, place, and time, appears well-nourished. No distress.           Impression:   1. Osteoporosis     2. Fracture, vertebra, pathologic         Plan:  1. The patient will take 1200 - 1500 mg of calcium  daily from the diet and supplements.  2. The patient will take 2000 IU of vit D daily.  3. Treatment options discussed with the patient and his friend. We will wait for Prolia approval from the insurance.  4. I am asking for follow up in 1 year with DXA scan .    Patient Instructions   Prolia 1st today.  Prolia 2nd in 6 months with my nurse.I will see you in 1 year.  DXA in 1 year .   Phone number to schedule [phone].  Please avoid any extensive dental work as implants and teeth extractions for the next 1-2 months.  Daily calcium need is 9567-2337 mg a day from the diet and supplements.  You should take 2 calcium pills/day.  Vitamin D 1000 IU daily recommended with your calcium and multivitamin.      40 minutes spent with the patient , who has severe dementia and was not able to provide any history. His friend, who is present today, was just driving him and does not know anything about his medical problems and history and medications.  We had to call his daughter who lives in [place] and took a lot of time until I confirm all of his medications and no previous history of fractures and chronic medical problems.  I spent a lot of time reviewing the chart and he did not see his primary since August 2017 but did have a few visits with Dr. Hill at the memory center.  He lives in the memory unit, nursing staff is giving him medications on a daily basis.  I sent printed information about Prolia treatment and possible side effects that need to be reported to me or his primary provider.  I signed all the orders about his supplements and increasing the dose of calcium daily.        Thank you for the opportunity to participate in the care of your patient. If you have any additional questions, do not hesitate to contact me at [facility].    This note has been dictated using voice recognition software. Any grammatical or context distortions are unintentional and inherent to the software      With  best personal regards,   Jerod Epps MD, CCD  3/27/2018

## 2021-06-16 NOTE — TELEPHONE ENCOUNTER
Telephone Encounter by Jayashree Engel CMA at 8/13/2019 11:16 AM     Author: Jayashree Engel CMA Service: -- Author Type: Medical Assistant    Filed: 8/13/2019 11:26 AM Encounter Date: 8/12/2019 Status: Signed    : Jayashree Engel CMA (Medical Assistant)       Spoke with the patient's daughter, Gracie, and relayed the following message from Kelsey:  Nora Mcneill CHW  You 17 minutes ago (10:49 AM)      They can call [facility] and speak with a specialist there.   [phone]    Routing comment      She states that was not her question.  Gracie was wanting to know which of those insurance companies would be in network for Dr. Martines.  Let her know that the best way to find that out is contact those insurance companies and ask if he would be in network.  She verbalized understanding and had no further questions at this time.  Jayashree MOHAN CMA/SURENDRA....................11:26 AM

## 2021-06-19 NOTE — LETTER
Letter by Ollie Fried NP at      Author: Ollie Fried NP Service: -- Author Type: --    Filed:  Encounter Date: 9/6/2019 Status: (Other)         Patient: Manjeet Oshea   MR Number: [id number]   YOB: 1934   Date of Visit: 9/6/2019     [facility]      Facility:    [facility] [[id number]] Code Status: DNR      Chief Complaint/Reason for Visit:   Chief Complaint   Patient presents with   ? Review Of Multiple Medical Conditions       HPI:   Manjeet is a 84 y.o. male who [facility] and discharged on 9/5/2019.  He has a past medical history of CAD status post CABG in 1983 and a stent placed in 2009, Alzheimer's dementia, TIA, heart failure reduced EF, HDL, hypertension, GERD who was admitted with a 2-day history of some increased confusion and lethargy with maroon stools x2 days.  He was on oxycodone so it was discontinued per his encephalopathy.  Initial hemoglobin was 11.2, then dropped to 8.8 with IV fluids though at discharge was 9.9.  Per his mental status family decided not to elect further work-up for his acute GI bleed as he started having regular stools.  His PPI was increased to 20 mg twice daily and his aspirin and pocket ago were held.  He also did have a new A. fib, cardiology was consulted.  They recommended warfarin though in light his core mobilities just an aspirin was recommended.  Of note he was taking oxycodone per vertebral compression fractures 2 months ago though seems to be without pain.  Per disposition he is unable to return back to his memory care unit and will be discharged to a skilled facility.  Per nutrition his intake has been poor and his unable to feed himself, did receive hospice consult and will possibly pursue in [facility].    Past Medical History:  Past Medical History:   Diagnosis Date   ? Asthma    ? Chronic back pain    ? Chronic sinusitis     ? Compression fracture of body of thoracic vertebra (H) 7/7/2019   ? Coronary artery disease    ? Dementia    ? Dyslipidemia    ? GERD (gastroesophageal reflux disease)    ? Hemorrhoids    ? HTN (hypertension)    ? Hyperlipidemia    ? Osteoporosis    ? Paroxysmal atrial fibrillation (H) 9/4/2019   ? TIA (transient ischemic attack)    ? Urinary retention 9/3/2019           Surgical History:  Past Surgical History:   Procedure Laterality Date   ? CORONARY ARTERY BYPASS GRAFT     ? FEMUR FRACTURE SURGERY Right 2005   ? FEMUR FRACTURE SURGERY Left 2002   ? SPINE SURGERY  1978, 2004       Family History:   No family history on file.    Social History:    Social History     Socioeconomic History   ? Marital status:      Spouse name: Not on file   ? Number of children: Not on file   ? Years of education: Not on file   ? Highest education level: Not on file   Occupational History   ? Not on file   Social Needs   ? Financial resource strain: Not on file   ? Food insecurity:     Worry: Not on file     Inability: Not on file   ? Transportation needs:     Medical: Not on file     Non-medical: Not on file   Tobacco Use   ? Smoking status: Former Smoker   ? Smokeless tobacco: Never Used   Substance and Sexual Activity   ? Alcohol use: No   ? Drug use: No   ? Sexual activity: Not on file   Lifestyle   ? Physical activity:     Days per week: Not on file     Minutes per session: Not on file   ? Stress: Not on file   Relationships   ? Social connections:     Talks on phone: Not on file     Gets together: Not on file     Attends Scientologist service: Not on file     Active member of club or organization: Not on file     Attends meetings of clubs or organizations: Not on file     Relationship status: Not on file   ? Intimate partner violence:     Fear of current or ex partner: Not on file     Emotionally abused: Not on file     Physically abused: Not on file     Forced sexual activity: Not on file   Other Topics Concern   ? Not  on file   Social History Narrative   ? Not on file          Review of Systems   Constitutional: Positive for activity change, appetite change and fatigue. Negative for chills and diaphoresis.   HENT: Positive for hearing loss. Negative for congestion.    Eyes: Negative.    Respiratory: Negative for shortness of breath and wheezing.    Cardiovascular: Negative.    Gastrointestinal: Negative for abdominal distention, abdominal pain, anal bleeding, blood in stool, constipation, diarrhea and nausea.   Endocrine: Negative.    Genitourinary: Negative for difficulty urinating.        Incontinent   Musculoskeletal:        Denies pain   Skin:        intact   Allergic/Immunologic: Negative.    Neurological: Positive for weakness. Negative for dizziness, tremors, speech difficulty and light-headedness.        Generalized   Hematological: Negative.    Psychiatric/Behavioral: Positive for confusion. Negative for agitation, dysphoric mood and sleep disturbance. The patient is not nervous/anxious.        Vitals:    09/06/19 0848   BP: 109/70   Pulse: 90   Resp: 18   Temp: 98  F (36.7  C)   SpO2: 98%   Weight: 141 lb (64 kg)       Physical Exam   Constitutional: No distress.   Being evaluated for hospice   HENT:   Head: Normocephalic and atraumatic.   Mouth/Throat: Oropharynx is clear and moist. No oropharyngeal exudate.   Eyes: Right eye exhibits no discharge. Left eye exhibits no discharge. No scleral icterus.   Neck: Normal range of motion. Neck supple. No JVD present.   Cardiovascular:   IRR   Pulmonary/Chest: Effort normal. No stridor. No respiratory distress. He has no wheezes. He has no rales.   Dim, RA   Abdominal: Soft. He exhibits no distension. There is no tenderness. There is no rebound.   No melena reported   Genitourinary:   Genitourinary Comments: Recently started on tamsulosin   Musculoskeletal: He exhibits no edema, tenderness or deformity.   Denies pain   Neurological: He is alert.   Alert and oriented x1/2, has  minimal recall   Skin: Skin is warm and dry. He is not diaphoretic.   Intact   Psychiatric: He has a normal mood and affect.   No behaviors reported   Vitals reviewed.      Medication List:  Current Outpatient Medications   Medication Sig   ? acetaminophen (TYLENOL) 500 MG tablet Take 2 tablets (1,000 mg total) by mouth 3 (three) times a day. (Patient taking differently: Take 1,000 mg by mouth 3 (three) times a day.       )   ? aspirin 81 MG EC tablet Take 81 mg by mouth daily.   ? docusate sodium (COLACE) 50 mg/5 mL oral liquid Take 10 mL (100 mg total) by mouth 2 (two) times a day.   ? escitalopram oxalate (LEXAPRO) 20 MG tablet Take 20 mg by mouth at bedtime.   ? lidocaine 4 % patch Place 1 patch on the skin daily. Place on back. Remove and discard patch with 12 hours. (Patient taking differently: Place 1 patch on the skin daily. Place on back in morning and remove at bedtime. On 12 hours, off for 12 hours      )   ? melatonin 10 mg TbER Take 1 tablet by mouth at bedtime.   ? metoprolol succinate (TOPROL-XL) 25 MG Take 1.5 tablets (37.5 mg total) by mouth daily.   ? multivitamin with minerals (THERA-M) 9 mg iron-400 mcg Tab tablet Take 1 tablet by mouth daily.   ? omeprazole (PRILOSEC) 20 MG capsule Take 1 capsule (20 mg total) by mouth 2 (two) times a day before meals. Open contents of capsule and sprinkle into room temp applesauce.   ? polyethylene glycol (MIRALAX) 17 gram packet Take 1 packet (17 g total) by mouth daily as needed.   ? tamsulosin (FLOMAX) 0.4 mg cap Take 1 capsule (0.4 mg total) by mouth every morning.       Labs:  Results for orders placed or performed during the hospital encounter of 07/07/19   Basic Metabolic Panel   Result Value Ref Range    Sodium 138 136 - 145 mmol/L    Potassium 3.8 3.5 - 5.0 mmol/L    Chloride 104 98 - 107 mmol/L    CO2 27 22 - 31 mmol/L    Anion Gap, Calculation 7 5 - 18 mmol/L    Glucose 91 70 - 125 mg/dL    Calcium 8.6 8.5 - 10.5 mg/dL    BUN 17 8 - 28 mg/dL     Creatinine 0.83 0.70 - 1.30 mg/dL    GFR MDRD Af Amer >60 >60 mL/min/1.73m2    GFR MDRD Non Af Amer >60 >60 mL/min/1.73m2     Lab Results   Component Value Date    WBC 11.8 (H) 09/07/2019    HGB 10.4 (L) 09/07/2019    HCT 31.5 (L) 09/07/2019    MCV 91 09/07/2019     09/07/2019     Lab Results   Component Value Date    TSH 2.36 11/05/2017     Lab Results   Component Value Date    [id number] 306 10/07/2011     Assessment/Plan:    GI bleed: Last hemoglobin 10.4, no melena reported.  Family declined work-up.  GI started omeprazole 20 mg twice daily    Hospice consult: Hospice evaluation pending    Leukocytosis: Last white count 11.8 on 9/7, UAUC ordered    History of CAD with stenting: Due to recent bleed advised to continue aspirin 81 mg daily only    Hypertension: Recently metoprolol succinate decreased to 37.5 mg daily, monitor blood pressure    History of compression fracture: Schedule oxycodone discontinued, continue Tylenol 1000 mg 3 times daily with lidocaine patch.  Denies pain    Depression: Continue escitalopram 20 mg daily    Insomnia: Continue melatonin 10 mg at bedtime    Constipation: Continue docusate 100 mg twice daily and MiraLAX daily as needed    Polypharmacy: Vitamin D, clopidogrel, simvastatin were discontinued in hospital    Disposition: We will reside in long-term care, hospice consult pending    The care plan has been reviewed and all orders signed. Changes to care plan, if any, as noted. Otherwise, continue care plan of care.  The total time spent with this patient was 35 minutes, with greater than 50% spent in counseling and coordination of care that included multiple issues per pain control, monitoring for melena with staff and hospice consult which lasted 18 minutes.    Post Discharge Medication Reconciliation Status: discharge medications reconciled, continue medications without change      Electronically signed by: Ollie Fried NP

## 2021-06-19 NOTE — LETTER
Letter by Kerline Hernandez MBBS at      Author: Kerline Hernandez MBBS Service: -- Author Type: --    Filed:  Encounter Date: 9/9/2019 Status: (Other)         Patient: Manjeet Oshea   MR Number: [id number]   YOB: 1934   Date of Visit: 9/9/2019       [facility] Admission note      Patient: Manjeet Oshea  MRN: [id number]  Date of Service: 9/9/2019      [facility] [[id number]]  Reason for Visit     Chief Complaint   Patient presents with   ? H & P       Code Status     DNR    Assessment     -Acute toxic encephalopathy  -History of GI bleed  -History of esophageal stricture  -History of chronic urinary retention  -End-stage dementia /profound confusion noted on exam today  -New onset atrial fibrillation  -History of multiple TIAs  - L2 compression fractures    Plan     Patient was admitted to long-term care.  He was admitted in the hospital for GI bleed concerns.  Family and GI elected not to pursue aggressive intervention including EGD.  He has been discharged on Prilosec 20 mg twice a day from once a day.  Plavix has been discontinued because of GI bleed.  He remains on low-dose aspirin and no warfarin was considered for his atrial fibrillation.  He has a history of L2 vertebral compression fracture.  He was taken off oxycodone as it was felt that his compression fracture has resolved and narcotics may be contributing to his encephalopathy   And decreased appetite   he is not reporting any significant amount of pain.  He is on a low-dose of metoprolol 37.5 mg because of low blood pressures  Flomax was started due to concerns of urinary retention.  He was given a voiding trial in the nursing home.  And recheck UA appeared to be dirty and he has been started on Cipro.  He denies any dysuria and his recheck urine cultures did not show any specific organism so his ciprofloxacin will be discontinued.  Medication review was done he will be taken off statin vitamin D and  calcium.  He will be on comfort care package.  Patient remains profoundly confused and disoriented not alert and oriented to his surroundings at all    History     Patient is a very pleasant 84 y.o. male who is admitted to TCU  Patient has significant dementia but presented with increasing confusion.  He was admitted in the hospital and noted to have acute metabolic encephalopathy in the setting of chronic dementia.  This was felt to be multifactorial secondary to oxycodone use which is scheduled for him in addition related to GI bleed.  He was admitted and his hemoglobin dropped to 8.8 from an admission hemoglobin of 11.2 initially thought to be dilutional.  However he had guaiac positive stools.  It was suspected he was having acute GI bleed.  No work-up was initiated in light of his profound dementia he has been put on PPIs twice daily aspirin and Plavix have been discontinued.  Patient also was noted to have new onset atrial fibrillation.  Cardiology saw him they recommended medical management in light of his moderate to severe dementia and numerous comorbidities warfarin was not recommended  Aspirin was recommended for management of his coronary artery disease TIAs and A. Fib.  She has a recent history of multiple compression fractures and was on oxycodone but this has been discontinued in light of his confusion.  Patient was felt to be failure to thrive with poor intake.  It was recommended that he consider hospice he has elected to move to long-term care on hospice cares  He has been taken off multiple supplementations as well as his oxycodone for pain management.  He is no longer a candidate for any blood thinners in light of his GI bleed.  He remains profoundly confused and not alert and oriented to his surroundings    Past Medical History     Active Ambulatory (Non-Hospital) Problems    Diagnosis   ? Paroxysmal atrial fibrillation (H)   ? Persistent atrial fibrillation (H)   ? Urinary retention   ?  Encounter for palliative care   ? Hospice care patient   ? Pain   ? Generalized muscle weakness   ? GI bleed   ? Metabolic encephalopathy   ? RLQ abdominal pain   ? Chronic systolic heart failure (H)   ? Dementia   ? Coronary artery disease involving native coronary artery of native heart without angina pectoris   ? Dyslipidemia   ? Urinary Frequency More Than Twice At Night ([place]cturia)   ? Hypercholesterolemia   ? Esophageal Reflux   ? Osteoporosis   ? Chronic Sinusitis   ? Limb Pain     Past Medical History:   Diagnosis Date   ? Asthma    ? Chronic back pain    ? Chronic sinusitis    ? Compression fracture of body of thoracic vertebra (H) 7/7/2019   ? Coronary artery disease    ? Dementia    ? Dyslipidemia    ? GERD (gastroesophageal reflux disease)    ? Hemorrhoids    ? HTN (hypertension)    ? Hyperlipidemia    ? Osteoporosis    ? Paroxysmal atrial fibrillation (H) 9/4/2019   ? TIA (transient ischemic attack)    ? Urinary retention 9/3/2019       Past Social History     Reviewed, and he  reports that he has quit smoking. He has never used smokeless tobacco. He reports that he does not drink alcohol or use drugs.    Family History     Reviewed, and his daughter has cerebral palsy    Medication List     Current Outpatient Medications on File Prior to Visit   Medication Sig Dispense Refill   ? acetaminophen (TYLENOL) 500 MG tablet Take 2 tablets (1,000 mg total) by mouth 3 (three) times a day. (Patient taking differently: Take 1,000 mg by mouth 3 (three) times a day.       )  0   ? aspirin 81 MG EC tablet Take 81 mg by mouth daily.     ? docusate sodium (COLACE) 50 mg/5 mL oral liquid Take 10 mL (100 mg total) by mouth 2 (two) times a day. 60 mL 2   ? escitalopram oxalate (LEXAPRO) 20 MG tablet Take 20 mg by mouth at bedtime.     ? lidocaine 4 % patch Place 1 patch on the skin daily. Place on back. Remove and discard patch with 12 hours. (Patient taking differently: Place 1 patch on the skin daily. Place on back in  morning and remove at bedtime. On 12 hours, off for 12 hours      ) 30 patch 0   ? melatonin 10 mg TbER Take 1 tablet by mouth at bedtime. 90 tablet 3   ? metoprolol succinate (TOPROL-XL) 25 MG Take 1.5 tablets (37.5 mg total) by mouth daily.     ? multivitamin with minerals (THERA-M) 9 mg iron-400 mcg Tab tablet Take 1 tablet by mouth daily.     ? omeprazole (PRILOSEC) 20 MG capsule Take 1 capsule (20 mg total) by mouth 2 (two) times a day before meals. Open contents of capsule and sprinkle into room temp applesauce. 90 capsule 3   ? polyethylene glycol (MIRALAX) 17 gram packet Take 1 packet (17 g total) by mouth daily as needed.  0   ? tamsulosin (FLOMAX) 0.4 mg cap Take 1 capsule (0.4 mg total) by mouth every morning.  0     No current facility-administered medications on file prior to visit.    D/C CIPRO    Allergies     No Known Allergies    Review of Systems   A comprehensive review of 14 systems was done. Pertinent findings noted here and in history of present illness. All the rest negative.  Constitutional: Negative.  Negative for fever, chills, he has activity change, appetite change and fatigue.   HENT: Negative for congestion and facial swelling.    Eyes: Negative for photophobia, redness and visual disturbance.   Respiratory: Negative for cough and chest tightness.    Cardiovascular: Negative for chest pain, palpitations and leg swelling.   Gastrointestinal: Negative for nausea, diarrhea, constipation, blood in stool and abdominal distention.   Genitourinary: Negative.    Musculoskeletal: Negative.  Very weak and wheelchair-bound  Skin: Negative.    Neurological: Negative for dizziness, tremors, syncope, weakness, light-headedness and headaches.   Hematological: Does not bruise/bleed easily.   Psychiatric/Behavioral: Negative.  Extremely confused recall is very limited      Physical Exam     Recent Vitals 9/6/2019   Height -   Weight 141 lbs   BSA (m2) 1.79 m2   /70   Pulse 90   Temp 98   Temp src  -   SpO2 98   Some recent data might be hidden       Constitutional: Oriented to person, and appears well-developed.   HEENT:  Normocephalic and atraumatic.  Eyes: Conjunctivae and EOM are normal. Pupils are equal, round, and reactive to light. No discharge.  No scleral icterus. Nose normal. Mouth/Throat: Oropharynx is clear and moist. No oropharyngeal exudate.    NECK: Normal range of motion. Neck supple. No JVD present. No tracheal deviation present. No thyromegaly present.   CARDIOVASCULAR: Normal rate, regular rhythm and intact distal pulses.  Exam reveals no gallop and no friction rub.  Systolic murmur present.  PULMONARY: Effort normal and breath sounds normal. No respiratory distress.No Wheezing or rales.  ABDOMEN: Soft. Bowel sounds are normal. No distension and no mass.  There is no tenderness. There is no rebound and no guarding. No HSM.  MUSCULOSKELETAL: Normal range of motion. No edema and no tenderness. Mild kyphosis, no tenderness.  LYMPH NODES: Has no cervical, supraclavicular, axillary and groin adenopathy.   NEUROLOGICAL: Alert and oriented to person, . No cranial nerve deficit.  Normal muscle tone. Coordination normal.   GENITOURINARY: Deferred exam.  SKIN: Skin is warm and dry. No rash noted. No erythema. No pallor.   EXTREMITIES: No cyanosis, no clubbing, no edema. No Deformity.  PSYCHIATRIC: Normal mood, affect and behavior.  Recall is profoundly impaired he is not alert and oriented to surroundings at all      Lab Results       Lab Results   Component Value Date    ALBUMIN 2.5 (L) 09/02/2019    ALT 25 09/02/2019    AST 29 09/02/2019    BUN 13 09/07/2019    CALCIUM 8.7 09/07/2019    CL 95 (L) 09/07/2019    CHOL 150 08/14/2015    CO2 29 09/07/2019    CREATININE 0.70 09/07/2019    GFRAA >60 09/07/2019    GFRNONAA >60 09/07/2019    GLU 91 09/07/2019    HCT 31.5 (L) 09/07/2019    WBC 11.8 (H) 09/07/2019    HGB 10.4 (L) 09/07/2019    MG 3.0 (H) 09/02/2019     09/07/2019    K 3.6 09/07/2019     PSA 0.5 09/19/2014     (L) 09/07/2019           Imaging Results     Xr Chest 2 Views    Result Date: 9/1/2019  EXAM: XR CHEST 2 VIEWS LOCATION: [facility] DATE/TIME: 9/1/2019 3:57 PM INDICATION: Altered mental status. COMPARISON: CT chest 7/7/2019. FINDINGS: Previous sternotomy for coronary artery bypass. Heart size and vascularity are normal. Large retrocardiac hiatal hernia. Shallow inspiration. Bibasilar fibroatelectasis with no acute infiltrate, pneumothorax nor pleural effusion. Generalized demineralization with old pole lower thoracic and upper lumbar compression deformities    Xr Humerus Right 2 Vws    Result Date: 9/1/2019  EXAM: XR HUMERUS RIGHT 2 VWS LOCATION: [facility] DATE/TIME: 9/1/2019 3:58 PM INDICATION: Status post fall with right arm pain. COMPARISON: None. FINDINGS: No evidence of fracture. Indeterminate irregular soft tissue calcifications posterior lateral aspect of the mid humeral diaphysis of uncertain etiology. Is there palpable mass in this location?    Ct Head Without Contrast    Result Date: 9/1/2019  EXAM: CT HEAD WO CONTRAST LOCATION: [facility] DATE/TIME: 9/1/2019 3:36 PM INDICATION: Confusion and altered mental status COMPARISON: CT head dated 7/7/2019 TECHNIQUE: CT head without IV contrast. Multiplanar reformats. Dose reduction techniques were used. FINDINGS: INTRACRANIAL CONTENTS: No acute intracranial hemorrhage. Scattered periventricular and deep cortical white matter areas of low attenuation, likely representing sequelae of chronic microangiopathy. No acute loss of gray-white matter differentiation to suggest large territorial infarction. Mild cerebral volume loss with appropriate size and morphology of the ventricular system without shift of the midline structures. No extra-axial fluid collections.  Patent basal cisterns. SINUSES/MASTOIDS/ORBITS:The visualized paranasal sinuses and temporal bone structures are well-aerated. Postoperative changes of  bilateral lenses, otherwise the orbits are unremarkable. BONES/SOFT TISSUES: The calvarium and skull base are unremarkable. Intracranial vascular calcifications.     Senescent changes and sequelae of chronic microangiopathy without acute intracranial abnormality. If there is continued clinical concern for acute intracranial pathology MRI is a more sensitive evaluation.     Ct Abdomen Pelvis Without Oral With Iv Contrast    Result Date: 9/1/2019  EXAM: CT ABDOMEN PELVIS WO ORAL W IV CONTRAST LOCATION: [facility] DATE/TIME: 9/1/2019 6:12 PM INDICATION: LLQ abdominal pain, diverticulitis suspected abdominal pain COMPARISON: CT 7/7/2019 TECHNIQUE: Helical enhanced thin-section CT scan of the abdomen and pelvis was performed following injection of IV contrast. Multiplanar reformats were obtained. Dose reduction techniques were used. CONTRAST: Iohexol (Omni) 75mL FINDINGS: LUNG BASES: Large sliding hiatal hernia. ABDOMEN: Normal-appearing liver, gallbladder, adrenals and spleen. Mild bilateral hydronephrosis. Patent mesenteric vessels. Retroaortic left renal vein. PELVIS: Large volume stool in colon compatible with impaction. Mild diffuse rectal wall thickening without mesenteric edema. Distended bladder. Small bowel appears normal. No ascites. MUSCULOSKELETAL: Multiple stable thoracic and lumbar compression fractures. Bilateral nondisplaced sacral insufficiency fractures. Prior sternotomy.     CONCLUSION: 1.  Fecal impaction. 2.  Urinary retention. 3.  Bilateral sacral nondisplaced insufficiency fractures. 4.  Hiatal hernia.     Us Venous Arm Right    Result Date: 9/1/2019  EXAM: US VENOUS ARM RIGHT LOCATION: [facility] DATE/TIME: 9/1/2019 4:21 PM INDICATION: mass/firmness COMPARISON: None. TECHNIQUE: Duplex exam performed utilizing 2D gray-scale imaging, Doppler interrogation with color-flow and spectral waveform analysis. Exam performed without and with compression when possible. FINDINGS: Ultrasound  includes evaluation of the internal jugular veins, innominate veins, subclavian veins, axillary veins, and brachial veins. The superficial cephalic and basilic veins were also evaluated where seen. RIGHT ARM VEINS: Negative for DVT. Imaging performed over the area right upper arm firmness. No soft tissue abnormality seen.     CONCLUSION: 1.  The right arm veins are negative for DVT.      Post Discharge Medication Reconciliation Status: discharge medications reconciled and changed, per note/orders (see AVS)      JADYN Jeffrey

## 2021-06-19 NOTE — LETTER
Letter by Ollie Fried NP at      Author: Ollie Fried NP Service: -- Author Type: --    Filed:  Encounter Date: 9/11/2019 Status: (Other)         Patient: Manjeet Oshea   MR Number: [id number]   YOB: 1934   Date of Visit: 9/11/2019     [facility]      Facility:    [facility] [[id number]] Code Status: DNR      Chief Complaint/Reason for Visit:   Chief Complaint   Patient presents with   ? Discharge Summary       HPI:   Manjeet is a 84 y.o. male who was initially admitted to [facility] admission on 9/1/2019 and discharged on 9/5/2019.  He has a past medical history of CAD status post CABG in 1983 and a stent placed in 2009, Alzheimer's dementia, TIA, heart failure reduced EF, HDL, hypertension, GERD who was admitted with a 2-day history of some increased confusion and lethargy with maroon stools x2 days.  He was on oxycodone so it was discontinued per his encephalopathy.  Initial hemoglobin was 11.2, then dropped to 8.8 with IV fluids though at discharge was 9.9.  Per his mental status family decided not to elect further work-up for his acute GI bleed as he started having regular stools.  His PPI was increased to 20 mg twice daily and his aspirin and pocket ago were held.  He also did have a new A. fib, cardiology was consulted.  They recommended warfarin though in light his core mobilities just an aspirin was recommended.  Of note he was taking oxycodone per vertebral compression fractures 2 months ago though seems to be without pain.  Per disposition he is unable to return back to his memory care unit and will be discharged to a skilled facility.  Per nutrition his intake has been poor and his unable to feed himself, did receive hospice consult and will possibly pursue in care center. He did receive evaluation from [facility] hospice initially though did not qualify.    Today informed he will be discharging to [facility] on  9/11/19.    Past Medical History:  Past Medical History:   Diagnosis Date   ? Asthma    ? Chronic back pain    ? Chronic sinusitis    ? Compression fracture of body of thoracic vertebra (H) 7/7/2019   ? Coronary artery disease    ? Dementia    ? Dyslipidemia    ? GERD (gastroesophageal reflux disease)    ? Hemorrhoids    ? HTN (hypertension)    ? Hyperlipidemia    ? Osteoporosis    ? Paroxysmal atrial fibrillation (H) 9/4/2019   ? TIA (transient ischemic attack)    ? Urinary retention 9/3/2019           Surgical History:  Past Surgical History:   Procedure Laterality Date   ? CORONARY ARTERY BYPASS GRAFT     ? FEMUR FRACTURE SURGERY Right 2005   ? FEMUR FRACTURE SURGERY Left 2002   ? SPINE SURGERY  1978, 2004       Family History:   No family history on file.    Social History:    Social History     Socioeconomic History   ? Marital status:      Spouse name: Not on file   ? Number of children: Not on file   ? Years of education: Not on file   ? Highest education level: Not on file   Occupational History   ? Not on file   Social Needs   ? Financial resource strain: Not on file   ? Food insecurity:     Worry: Not on file     Inability: Not on file   ? Transportation needs:     Medical: Not on file     Non-medical: Not on file   Tobacco Use   ? Smoking status: Former Smoker   ? Smokeless tobacco: Never Used   Substance and Sexual Activity   ? Alcohol use: No   ? Drug use: No   ? Sexual activity: Not on file   Lifestyle   ? Physical activity:     Days per week: Not on file     Minutes per session: Not on file   ? Stress: Not on file   Relationships   ? Social connections:     Talks on phone: Not on file     Gets together: Not on file     Attends Synagogue service: Not on file     Active member of club or organization: Not on file     Attends meetings of clubs or organizations: Not on file     Relationship status: Not on file   ? Intimate partner violence:     Fear of current or ex partner: Not on file      Emotionally abused: Not on file     Physically abused: Not on file     Forced sexual activity: Not on file   Other Topics Concern   ? Not on file   Social History Narrative   ? Not on file          Review of Systems   Constitutional: Positive for activity change, appetite change and fatigue. Negative for chills and diaphoresis.        No concerns    HENT: Positive for hearing loss. Negative for congestion.    Eyes: Negative.    Respiratory: Negative for shortness of breath and wheezing.    Cardiovascular: Negative.    Gastrointestinal: Negative for abdominal distention, abdominal pain, anal bleeding, blood in stool, constipation, diarrhea and nausea.        No melena reported   Endocrine: Negative.    Genitourinary: Negative for difficulty urinating.        Incontinent   Musculoskeletal:        Denies pain   Skin:        intact   Allergic/Immunologic: Negative.    Neurological: Positive for weakness. Negative for dizziness, tremors, speech difficulty and light-headedness.        Generalized   Hematological: Negative.    Psychiatric/Behavioral: Positive for confusion. Negative for agitation, dysphoric mood and sleep disturbance. The patient is not nervous/anxious.        Vitals:    09/11/19 0957   BP: 116/65   Pulse: 98   Resp: 18   Temp: 98  F (36.7  C)   SpO2: 92%   Weight: 141 lb (64 kg)       Physical Exam   Constitutional: No distress.   Did not qualify for hospice. He will be discharging today   HENT:   Head: Normocephalic and atraumatic.   Mouth/Throat: Oropharynx is clear and moist. No oropharyngeal exudate.   Eyes: Right eye exhibits no discharge. Left eye exhibits no discharge. No scleral icterus.   Neck: Normal range of motion. Neck supple. No JVD present.   Cardiovascular:   IRR   Pulmonary/Chest: Effort normal. No stridor. No respiratory distress. He has no wheezes. He has no rales.   Dim, RA   Abdominal: Soft. He exhibits no distension. There is no tenderness. There is no rebound.   No melena reported    Genitourinary:   Genitourinary Comments: Recently started on tamsulosin   Musculoskeletal: He exhibits no edema, tenderness or deformity.   Denies pain   Neurological: He is alert.   Alert and oriented x1/2, has minimal recall   Skin: Skin is warm and dry. He is not diaphoretic.   Intact   Psychiatric: He has a normal mood and affect.   No behaviors reported   Vitals reviewed.      Medication List:  Current Outpatient Medications   Medication Sig   ? acetaminophen (TYLENOL) 500 MG tablet Take 2 tablets (1,000 mg total) by mouth 3 (three) times a day. (Patient taking differently: Take 1,000 mg by mouth 3 (three) times a day.       )   ? aspirin 81 MG EC tablet Take 81 mg by mouth daily.   ? docusate sodium (COLACE) 50 mg/5 mL oral liquid Take 10 mL (100 mg total) by mouth 2 (two) times a day.   ? escitalopram oxalate (LEXAPRO) 20 MG tablet Take 20 mg by mouth at bedtime.   ? lidocaine 4 % patch Place 1 patch on the skin daily. Place on back. Remove and discard patch with 12 hours. (Patient taking differently: Place 1 patch on the skin daily. Place on back in morning and remove at bedtime. On 12 hours, off for 12 hours      )   ? melatonin 10 mg TbER Take 1 tablet by mouth at bedtime.   ? metoprolol succinate (TOPROL-XL) 25 MG Take 1.5 tablets (37.5 mg total) by mouth daily.   ? multivitamin with minerals (THERA-M) 9 mg iron-400 mcg Tab tablet Take 1 tablet by mouth daily.   ? omeprazole (PRILOSEC) 20 MG capsule Take 1 capsule (20 mg total) by mouth 2 (two) times a day before meals. Open contents of capsule and sprinkle into room temp applesauce.   ? polyethylene glycol (MIRALAX) 17 gram packet Take 1 packet (17 g total) by mouth daily as needed.   ? tamsulosin (FLOMAX) 0.4 mg cap Take 1 capsule (0.4 mg total) by mouth every morning.       Labs:  Results for orders placed or performed in visit on 09/07/19   Basic Metabolic Panel   Result Value Ref Range    Sodium 132 (L) 136 - 145 mmol/L    Potassium 3.6 3.5 - 5.0  mmol/L    Chloride 95 (L) 98 - 107 mmol/L    CO2 29 22 - 31 mmol/L    Anion Gap, Calculation 8 5 - 18 mmol/L    Glucose 91 70 - 125 mg/dL    Calcium 8.7 8.5 - 10.5 mg/dL    BUN 13 8 - 28 mg/dL    Creatinine 0.70 0.70 - 1.30 mg/dL    GFR MDRD Af Amer >60 >60 mL/min/1.73m2    GFR MDRD Non Af Amer >60 >60 mL/min/1.73m2     Lab Results   Component Value Date    WBC 11.8 (H) 09/07/2019    HGB 10.4 (L) 09/07/2019    HCT 31.5 (L) 09/07/2019    MCV 91 09/07/2019     09/07/2019     Lab Results   Component Value Date    TSH 2.36 11/05/2017     Lab Results   Component Value Date    XGFYRUIB03 306 10/07/2011     Assessment/Plan:    GI bleed: Last hemoglobin 10.4, no melena reported.  Family declined work-up.  GI started omeprazole 20 mg twice daily    Hospice consult: Hospice evaluation pending    Leukocytosis: Last white count 11.8 on 9/7, started on cipro 250mg two times a day x3d per E coli UTI, resolved    History of CAD with stenting: Due to recent bleed advised to continue aspirin 81 mg daily only    Hypertension: Recently metoprolol succinate decreased to 37.5 mg daily, monitor blood pressure in new facility    History of compression fracture: Schedule oxycodone discontinued in hospital, continue Tylenol 1000 mg 3 times daily with lidocaine patch.  Denies pain    Depression: Continue escitalopram 20 mg daily    Insomnia: Continue melatonin 10 mg at bedtime    Constipation: Continue docusate 100 mg twice daily and MiraLAX daily as needed    Polypharmacy: Vitamin D, clopidogrel, simvastatin were discontinued in hospital    Disposition: he will reside in long-term care and now discharged to [facility] on 9/11/19     The care plan has been reviewed and all orders signed. Changes to care plan, if any, as noted. Otherwise, continue care plan of care.  The total time spent with this patient was 31 minutes, with greater than 50% spent in counseling and coordination of care that included multiple issues per pain  control, monitoring for melena with staff, hospice and discharge which lasted 16 minutes.    Post Discharge Medication Reconciliation Status: discharge medications reconciled, continue medications without change      Electronically signed by: Ollie Fried NP

## 2021-06-19 NOTE — PROGRESS NOTES
[facility] Follow Up Note    Manjeet Oshea   83 y.o. male    Date of Visit: 8/15/2018    Chief Complaint   Patient presents with     Cough     needs PT orders per Spine Dr's     Subjective  This is an 83-year-old man that I have not seen in a while.  He has issues with osteoporosis and chronic low back pain.  He also has mild dementia.  He is in an assisted living type of facility.  He had an increased issues with his back and did see the spine clinic.  I have reviewed those notes.  There was a strong recommendation against any spinal injections because of his osteoporosis.  He was referred to an osteoporosis specialist who along with the spine clinic is recommending Prolia injections.  He has not as yet started these.  Some physical therapy was recommended and they wanted to make sure an order was put in for this at his current residence.  Spine had also recommended use of lidocaine patches or gel for his back pain.  They have started this and it does seem to be helping somewhat but they needed clarification on the order with his residents.  In addition he had been having a persistent cough.  He ended up last Saturday and going to a walk-in clinic and I have also reviewed those notes.  He was placed on Zithromax and is completing that today.  It is reported that his cough is better and this seems to be resolving.  He is here with a friend today and also on the phone is his daughter from out of town who was involved in the conversation.  No other changes or new issues are reported.    ROS A comprehensive review of systems was performed and was otherwise negative    Medications, allergies, and problem list were reviewed and updated    Exam  General Appearance:   On examination blood pressure is 122/60.  Weight is 173 pounds and height is 69 inches.  BMI is 25.55.    Lungs are clear.    Heart is sinus rhythm with a rate of 60 and no ectopy.    His gait with his cane is  reasonable.    Patient's mental status appears to be unchanged.      Assessment/Plan  1. Osteoporosis     2. Backache     3. Dementia associated with other underlying disease without behavioral disturbance       Low back pain.  We will continue with the recommendations of the spine clinic including the lidocaine patches and physical therapy.    Osteoporosis.  Apparently the patient will be started on Prolia injections through the osteoporosis specialist.    Mild dementia.  No change.    I will plan to see him back in 4-6 months.  Total time of this office visit was 25 minutes with greater than 50% of the time spent in care coordination and patient counseling.      Colby Martines MD      Current Outpatient Prescriptions on File Prior to Visit   Medication Sig     acetaminophen (TYLENOL) 325 MG tablet Take 325 mg by mouth every 6 (six) hours as needed for pain.      aspirin 81 MG EC tablet Take 81 mg by mouth daily.     azithromycin (ZITHROMAX Z-ENRIQUE) 250 MG tablet 2 tabs (500 mg ) day #1, then 1 tab (250 mg) days #2-5, total 5 days     calcium citrate-vitamin D (CITRACAL+D) 315-200 mg-unit per tablet Take 1 tablet by mouth daily.     cholecalciferol, vitamin D3, 1,000 unit tablet Take 2,000 Units by mouth daily.      clopidogrel (PLAVIX) 75 mg tablet Take 1 tablet by mouth  daily     co-enzyme Q-10 30 mg capsule Take 30 mg by mouth 3 (three) times a day.     guaiFENesin (ROBITUSSIN) 100 mg/5 mL syrup Take 10 mL (200 mg total) by mouth 4 (four) times a day. Until cough resolved.     lidocaine (XYLOCAINE) 5 % ointment Apply two times a day prn for pain to the affected area.     metoprolol succinate (TOPROL-XL) 50 MG 24 hr tablet Take 1 tablet (50 mg total) by mouth daily.     multivitamin with minerals (THERA-M) 9 mg iron-400 mcg Tab tablet Take 1 tablet by mouth daily.     omeprazole (PRILOSEC) 20 MG capsule Take 1 capsule by mouth  daily     simvastatin (ZOCOR) 40 MG tablet Take 40 mg by mouth bedtime.     Current  Facility-Administered Medications on File Prior to Visit   Medication     denosumab 60 mg (PROLIA 60 mg/ml)     No Known Allergies  Social History   Substance Use Topics     Smoking status: Former Smoker     Smokeless tobacco: Never Used     Alcohol use No

## 2021-06-19 NOTE — PROGRESS NOTES
Assessment/Plan:   Cough following prolonged URI  Bronchitis, acute  URI x 2 weeks, increased cough this week, rattly breathing. No wheeze or shortness of breath, no sinus pain CXR shows no pneumonia.   - XR Chest 2 Views  - guaiFENesin (ROBITUSSIN) 100 mg/5 mL syrup; Take 10 mL (200 mg total) by mouth 4 (four) times a day. Until cough resolved.  Dispense: 240 mL; Refill: 1  - azithromycin (ZITHROMAX Z-ENRIQUE) 250 MG tablet; 2 tabs (500 mg ) day #1, then 1 tab (250 mg) days #2-5, total 5 days  Dispense: 6 tablet; Refill: 0    Rest, Fluids, diet as tolerated  Robitussin 4 times a day to loosen cough until resolved  Azithromycin as directed  Follow up as needed    Subjective:      Manjeet Oshea is a 83 y.o. male who presents with cough.  He comes with a personal care attendant. Discussed with his daughter by phone in the room.  He lives in assisted living. He developed a cold about 2 weeks ago with mainly nasal congestion at first. More cough during this last week and care givers report that it sounds rattly. No apparent wheezing or shortness of breath. No history of asthma/COPD or having to use an inhaler for respiratory infection. No fever, no malaise, eating is not great but that is not atypical. No increased leg swelling or pain. No chest pain. Has not taken anything for symptoms. No V/D. No rash.  Ongoing chronic low back pain but no new urinary sxs. NKDA    Current Outpatient Prescriptions on File Prior to Visit   Medication Sig Dispense Refill     acetaminophen (TYLENOL) 325 MG tablet Take 325 mg by mouth every 6 (six) hours as needed for pain.        aspirin 81 MG EC tablet Take 81 mg by mouth daily.       calcium citrate-vitamin D (CITRACAL+D) 315-200 mg-unit per tablet Take 1 tablet by mouth daily.       cholecalciferol, vitamin D3, 1,000 unit tablet Take 2,000 Units by mouth daily.        clopidogrel (PLAVIX) 75 mg tablet Take 1 tablet by mouth  daily 90 tablet 3     co-enzyme Q-10 30 mg capsule Take 30 mg  by mouth 3 (three) times a day.       lidocaine (XYLOCAINE) 5 % ointment Apply two times a day prn for pain to the affected area. 35.44 g 2     metoprolol succinate (TOPROL-XL) 50 MG 24 hr tablet Take 1 tablet (50 mg total) by mouth daily. 30 tablet 0     multivitamin with minerals (THERA-M) 9 mg iron-400 mcg Tab tablet Take 1 tablet by mouth daily.       omeprazole (PRILOSEC) 20 MG capsule Take 1 capsule by mouth  daily 90 capsule 3     simvastatin (ZOCOR) 40 MG tablet Take 40 mg by mouth bedtime.       Current Facility-Administered Medications on File Prior to Visit   Medication Dose Route Frequency Provider Last Rate Last Dose     denosumab 60 mg (PROLIA 60 mg/ml)  60 mg Subcutaneous Q6 Months Jerod Epps MD         Patient Active Problem List   Diagnosis     Coronary Artery Disease     Hemorrhoids     Hypercholesterolemia     Esophageal Reflux     Upper Respiratory Infection     Osteoporosis     Cellulitis     Backache     Chest Pain     Acute Sinusitis     Chronic Sinusitis     Cellulitis Of The Left Leg     Limb Pain     Diarrhea     Urinary Frequency More Than Twice At Night (Nocturia)     Dementia     Coronary artery disease involving native coronary artery of native heart without angina pectoris     Dyslipidemia       Objective:     /70  Pulse 71  Temp 98.6  F (37  C) (Oral)   Resp 16  Wt 172 lb (78 kg)  SpO2 96%  BMI 25.4 kg/m2    Physical  General Appearance: Alert, cooperative, no distress.  AVSS  Head: Normocephalic, without obvious abnormality, atraumatic  Eyes: Conjunctivae are normal.   Ears: Normal TMs and external ear canals, both ears  Nose: congestion without purulent drainage or sinus pain.  Throat: Throat is normal.  No exudate.  No significant lesions  Neck: No adenopathy  Lungs: few rales bases, no wheeze, respirations unlabored.   Heart: Regular rate and rhythm  Extremities: No lower extremity edema  Skin: Skin color, texture, turgor normal, no rashes or lesions  Psychiatric:  Patient has a normal mood and affect.      CXR was obtained and viewed by me without acute infiltrate or fluid.

## 2021-06-19 NOTE — PROGRESS NOTES
Assessment / Plan:     Diagnoses and all orders for this visit:    Lumbalgia  -     lidocaine (XYLOCAINE) 5 % ointment; Apply two times a day prn for pain to the affected area.  Dispense: 35.44 g; Refill: 2    Thoracic back pain  -     lidocaine (XYLOCAINE) 5 % ointment; Apply two times a day prn for pain to the affected area.  Dispense: 35.44 g; Refill: 2    Myofascial pain  -     lidocaine (XYLOCAINE) 5 % ointment; Apply two times a day prn for pain to the affected area.  Dispense: 35.44 g; Refill: 2    Osteoporosis    Sleep disturbance  -     lidocaine (XYLOCAINE) 5 % ointment; Apply two times a day prn for pain to the affected area.  Dispense: 35.44 g; Refill: 2          Manjeet Oshea is a 83 y.o. y.o. male with past medical history significant for coronary artery disease, hypercholesterolemia, severe reflux disease, osteoporosis, who presents today for follow-up regarding chronic thoracic, and lumbar pain.  Patient with history of subacute L2 superior endplate, left L4 vertebral fracture seen on the lumbar MRI back in August 2017.  Patient denies history of recent trauma to his back.  Patient with no tenderness at the thoracic, lumbar spinous processes with physical exam today,  ruling out compression fracture.  DEXA scan showed osteoporosis.  Patient symptoms are likely due to myofascial pain, due to thoracic,  lumbar kyphosis, osteoporosis, facet arthropathy of the thoracic and the lumbar spine.  No red flags.      A shared decision making plan was used.  The patient's values and choices were respected.  The following represents what was discussed and decided upon by the physician and the patient.      1.  DIAGNOSTIC TESTS: No new imaging to review today.    2.  PHYSICAL THERAPY: I recommend patient to restart on physical therapy program, since he had improvement with it in the past.     3.  MEDICATIONS: I recommend patient to take the Tylenol 325 mg as needed for pain.  He may increase up to 2 tablets 3  times a day as needed for pain.  I prescribed lidocaine 5% ointment to be applied to the affected area today.  I discussed with patient that he also can obtain Salonpas patches over-the-counter. Patient and his daughter wanted to discuss the side effects of the Prolia today.  I discussed the pros and cons from being on Prolia with the patient and his daughters today.  After our discussion they elected to proceed with treatment.    4.  INTERVENTIONS: Facet steroid injection might help controlling his pain however, I do not think it is a good option for him since he has osteoporosis, and his pain can be controlled with Tylenol 325 mg.    5.  PATIENT EDUCATION: I thoroughly discussed the plan with the patient today.  I discussed with the patient and his daughter the importance of physical therapy for strengthening his thoracic and lumbar muscles, as well as strengthening his bones.  I discussed with the patient that he may use the TLSO brace only when he is doing any physical activities.  He verbalizes understanding and agrees with the plan.    6.  FOLLOW-UP: Patient will follow up with me in 7  weeks.  All questions were answered.            Subjective:     Manjeet Oshea is a 83 y.o. male who presents today for follow-up regarding low back pain, thoracic pain.  Today patient returns to the clinic reporting ongoing mid to low back pain and rates it 4 out of 10 intensity pain.  His symptoms are aggravated by walking, bending over, sitting for too long and rates it at 6 out of 10 intensity on its worse.  Patient is taking Tylenol 325 mg 1-2 tablets daily as needed for pain.  His lumbar MRI that was done back in August 2017  showed acute to subacute L2 superior endplate and left L4 vertebral body inferior endplate fracture.  There was no significant retropulsion of the fractures fragments.  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  His lumbar  x-ray that was done in September 2017 showed loss of the vertebral body height at the L1 however appears grossly stable.  There is stable mild loss of vertebral body height at the L3.  There is minimal loss of vertebral body heights at the L4 and it is stable.  There is diffuse osteopenia.  Patient had physical therapy last year and he states that he feels that it helped with his symptoms, however it was not long enough.  He was seen by Dr. Epps, and a DEXA scan showed osteoporosis.  Patient was offered treatment however he and his daughters were concerned about the possible side effects.  He was offered Prolia after getting the approval from the insurance.  He will also follow-up with Dr. Epps for repeat of the DEXA scan in 1 year.     Today patient denies any falls, trauma to his neck mid to low back.  He denies motor vehicle accidents.  Patient reports mild intermittent urinary dripping, and denies urinary incontinence or bowel incontinence.  He denies weakness in his lower extremity.  He reports balance issues when he is using a cane due to his back pain.  Denies any chilling on the holiday and I am dictating very patient denies unintentional weight loss, saddle anesthesia, fever or chills, balance difficulties.    Review of Systems:  Bilateral neck pain.  Balance issues. Patient denies urinary or bowel incontinence, unintentional weight loss, saddle anesthesia, fever or chills, balance difficulties.       Objective:   CONSTITUTIONAL:  Vital signs as above.  No acute distress.  The patient is well nourished and well groomed.    PSYCHIATRIC:  The patient is awake, alert, oriented to person, place and time.  The patient is answering questions appropriately with clear speech.  Normal affect.  SKIN:  Skin over the face, posterior torso, bilateral upper and lower extremities is clean, dry, intact without rashes.  MUSCULOSKELETAL:   Gait is antalgic.  The patient is able to heel and toe walk with some  difficulty.  Mild tenderness over the L1-L2, L2-L3, L3-L4 lumbar paraspinal muscles.  Physical exam reveals thoracic lumbar kyphosis.  No tenderness at the spinous processes T5 through S1.  The patient has 5/5 strength for the bilateral hip flexors, knee flexors/extensors, ankle dorsiflexors/plantar flexors, ankle evertors/invertors.    NEUROLOGICAL: Trace patellar, medial hamstring, achilles reflexes which are symmetric bilaterally.  No ankle clonus bilaterally.  Sensation to light touch is intact in the bilateral L4, L5, and S1 dermatomes.   Negative straight leg raise bilaterally, Baldev test bilaterally, hip impingement bilaterally.     RESULTS:      [facility]  MR LUMBAR SPINE WO CONTRAST  8/29/2017 12:04 PM      INDICATION: Thoracic, lumbar pain.  TECHNIQUE: Routine.  CONTRAST: None  COMPARISON: Prior lumbar spine MRI examinations, last performed 08/16/2016.     FINDINGS: Nomenclature is based on 5 lumbar type vertebral bodies. Dextroconvex curvature apex at L1-L2, with stable mild 3 mm retrolisthesis of L3 on L4. Focal segmental kyphosis is partially imaged, related to chronic multilevel vertebral body   insufficiency fractures spanning the levels of T11 through L3, measuring approximately 20 degrees as measured from the superior endplate of T12 to the inferior endplate of L3. Newly appreciated infiltrative edematous marrow changes are associated with   new L2 superior endplate compression fracture, with approximately 50% height loss. Edema extends along both pedicles, with minimal retropulsion of fracture fragments into the ventral spinal canal. Newly appreciated geographic and infiltrative low T1 and   bright T2 marrow signal intensity is associated with the left aspect of the L4 vertebral body, additional suspected fracture involving the left aspect of the vertebral body and inferior endplate. No pars defect. The conus tip is identified at the level   of L1-L2 interspace. Paraspinal muscle atrophy  is commensurate with age, with noted chronic postsurgical changes related to remote L3-L4 through L5-S1 posterior decompression. No abdominal aortic aneurysm. Fixation hardware is associated with the   bilateral hips.     T12-L1: Mild degenerative disc disease in association with L1 vertebral body compression fracture, with mild circumferential disc bulge. Mild to moderate bilateral facet arthropathy. No spinal canal stenosis. No right neural foraminal stenosis. No left   neural foraminal stenosis.     L1-L2: Mild intervertebral disc height loss with relative preservation of T2 signal intensity. Minimal circumferential disc bulge. Mild to moderate bilateral facet arthropathy. No spinal canal stenosis. No right neural foraminal stenosis. No left neural   foraminal stenosis.     L2-L3: Relative preservation of intervertebral disc height with loss of T2 signal intensity. No significant disc herniation. Mild bilateral facet arthropathy. No spinal canal stenosis. Mild right neural foraminal stenosis. Mild to moderate left neural   foraminal stenosis.     L3-L4: Mild to moderate intervertebral disc height and T2 signal loss, with circumferential disc bulge and dorsal disc uncovering related to retrolisthesis of L3 on L4. Moderate to advanced bilateral facet arthropathy. Mild spinal canal and moderate   right lateral recess stenosis status post decompression, stable. Moderate right neural foraminal stenosis. Moderate left neural foraminal stenosis.     L4-L5: Mild intervertebral height loss, with circumferential disc osteophyte complex. Moderate right and moderate to severe left facet arthropathy. Adequate spinal canal patency status post decompression. Moderate right neural foraminal stenosis.   Moderate left neural foraminal stenosis.     L5-S1: Moderate to advanced intervertebral disc height and T2 signal loss, with circumferential disc osteophyte complex. Mild to moderate bilateral facet arthropathy. Adequate spinal  canal patency status post decompression, with stable encroachment on   the traversing left S1 nerve root. Mild right neural foraminal stenosis. Mild left neural foraminal stenosis.     IMPRESSION:   CONCLUSION:  1.  Interval development of acute to subacute L2 superior endplate and left L4 vertebral body/ inferior endplate fractures. No significant retropulsion of fracture fragments into the ventral spinal canal.   2.  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  3.  Stable background of spondylitic changes, with adequate spinal canal patency status post L3-L4 through L5-S1 posterior decompression.

## 2021-06-20 NOTE — PROGRESS NOTES
1. Did you experience new onset of achiness or rashes that lasted for over a month? No  2. Do you feel sick today - fever > 101F, or new deep cough? No  3. Did you have gastric bypass or parathyroid surgery in the last 6 months? No  4. Do you plan any dental implants or extractions in the next 2-3 months? No  5. Have you started any new medications in the last 6 months that you were told could affect your immune system? These may have been prescribed by Oncologist, Transplant Center, Rheumatology or Dermatology. No    Patient was scheduled for next Prolia/Osteo Follow Up.

## 2021-06-21 NOTE — PROGRESS NOTES
[facility] Follow Up Note    Manjeet Oshea   83 y.o. male    Date of Visit: 11/12/2018    Chief Complaint   Patient presents with     Follow-up     and Face to Face for new C-pap, needs note to change acetaminophen to TID daily     Subjective  This is an 83-year-old man who is now residing a nursing facility.  He comes in because of broken sleep apnea equipment.  I had forgotten that he was dealing with sleep apnea.  The diagnosis was made at least 15 years ago and he is using the CPAP machine off and on since then.  Exactly how he has been using it since he developed his mild dementia is a little uncertain.  His daughter says that he has been unable to use the machine although she does not recall him using a mask with his CPAP.  She notes that when he does not use it there are periods of apnea during the night and he is a little more confused the next day.  No other new problems are reported.  Medications have not changed.  His dementia seems to be fairly stable and he is not having any bad outbursts or behavioral issues.    ROS A comprehensive review of systems was performed and was otherwise negative    Medications, allergies, and problem list were reviewed and updated    Exam  General Appearance:   On examination his blood pressure is 122/60.  Weight is 173 pounds and height is 69 inches.  O2 saturation is 93%.    Lungs are clear.    Heart is in a sinus rhythm with a rate of 75 and no ectopy.    No peripheral edema.    The patient does seem to be alert and oriented x3 today.  He is in a very pleasant mood.      Assessment/Plan  1. Sleep apnea, unspecified type  Ambulatory referral to Sleep Medicine   2. Dementia without behavioral disturbance, unspecified dementia type       Sleep apnea.  Given the length of time since the original diagnosis and the trouble with the equipment I think it would be better if he were reevaluated in the sleep clinic.  We will set up that  consultation.    Dementia.  Seemingly stable at this time.  No behavioral issues.  No new treatment.    I will see the patient back as needed.  We did put in an order for Tylenol extra strength 2 tablets 3 times daily at the nursing facility.    Total time of this office visit was 25 minutes with greater than 50% of the time spent in care coordination and patient counseling.  The following high BMI interventions were performed this visit: weight monitoring    Colby Martines MD      Current Outpatient Medications on File Prior to Visit   Medication Sig     acetaminophen (TYLENOL) 325 MG tablet Take 325 mg by mouth every 6 (six) hours as needed for pain.      aspirin 81 MG EC tablet Take 81 mg by mouth daily.     calcium citrate-vitamin D (CITRACAL+D) 315-200 mg-unit per tablet Take 1 tablet by mouth daily.     cholecalciferol, vitamin D3, 1,000 unit tablet Take 2,000 Units by mouth daily.      clopidogrel (PLAVIX) 75 mg tablet Take 1 tablet by mouth  daily     co-enzyme Q-10 30 mg capsule Take 30 mg by mouth 3 (three) times a day.     guaiFENesin (ROBITUSSIN) 100 mg/5 mL syrup Take 10 mL (200 mg total) by mouth 4 (four) times a day. Until cough resolved.     lidocaine (XYLOCAINE) 5 % ointment Apply two times a day prn for pain to the affected area.     metoprolol succinate (TOPROL-XL) 50 MG 24 hr tablet Take 1 tablet (50 mg total) by mouth daily.     multivitamin with minerals (THERA-M) 9 mg iron-400 mcg Tab tablet Take 1 tablet by mouth daily.     omeprazole (PRILOSEC) 20 MG capsule Take 1 capsule by mouth  daily     simvastatin (ZOCOR) 40 MG tablet Take 40 mg by mouth bedtime.     Current Facility-Administered Medications on File Prior to Visit   Medication     denosumab 60 mg (PROLIA 60 mg/ml)     No Known Allergies  Social History     Tobacco Use     Smoking status: Former Smoker     Smokeless tobacco: Never Used   Substance Use Topics     Alcohol use: No     Drug use: No

## 2021-06-24 NOTE — TELEPHONE ENCOUNTER
Orders being requested: PT eval.  Reason service is needed/diagnosis: Recent fall  When are orders needed by: asap  Where to send Orders: Phone:  [facility] at [phone]  Okay to leave detailed message?  Yes

## 2021-06-24 NOTE — PROGRESS NOTES
[facility] Follow Up Note    Manjeet Oshea   84 y.o. male    Date of Visit: 3/13/2019    Chief Complaint   Patient presents with     Follow-up     fall     Subjective  This is an 84-year-old man who resides in an assisted living facility.  He comes up to follow-up on a recent fall and visit to walk-in clinic.  He had been on an outing with the group from his residence when he lost his balance and fell backwards apparently into some bus steps causing back pain.  The friend who is with him today reports that for whatever reason no one was notified after this for 2 or 3 days.  He continued to experience pain and so was taken to walk-in clinic.  I have reviewed those notes.  His back x-ray shows significant issues but most of this is chronic.  There did not appear to be any new acute issues.  The patient feels his back is improved since then and he is pretty much back to his baseline self.  He does have some mild dementia so we do take what he says with a small grain of salt.  His friend reports that he has been off balance on occasion and has come close to falling.  She has encouraged him to use his walker instead of a cane which is probably a good idea.  Reports from assisted living show that his blood pressures been a little bit on the low side and so the question is raised as to whether or not we should reduce his medication.  No other new issues are reported.    ROS A comprehensive review of systems was performed and was otherwise negative    Medications, allergies, and problem list were reviewed and updated    Exam  General Appearance:   On examination his blood pressure here is 122/60.  Weight is 169 pounds and height is 69 inches.  BMI is 24.96.    Heart rhythm is stable with a rate of 60 and no ectopy.    No significant back tenderness.    The patient's mental status seems to be at baseline.  Does seem alert and oriented x3.      Assessment/Plan  1. Chronic low back pain without  sciatica, unspecified back pain laterality     2. Essential hypertension     3. Recurrent falls       Chronic back pain.  Recent exacerbation after a fall.  He seems to be back to baseline.  We did discuss visiting his back doctor again which might not be a bad idea just for an updated opinion.  No other changes recommended.    Hypertension.  His blood pressure here is stable but at the assisted living facility has been a little low.  It is possible this is contributing to his near falls and so we will reduce his metoprolol to 50 mg daily from 100.    I will plan to follow-up with him in a few months or sooner if needed.    Total time of this office visit was 25 minutes with greater than 50% of the time spent in care coordination and patient counseling.      Colby Martines MD      Current Outpatient Medications on File Prior to Visit   Medication Sig     acetaminophen (TYLENOL) 325 MG tablet Take 325 mg by mouth every 6 (six) hours as needed for pain.      aspirin 81 MG EC tablet Take 81 mg by mouth daily.     benzonatate (TESSALON PERLES) 100 MG capsule Take 1 capsule (100 mg total) by mouth every 6 (six) hours as needed for cough.     calcium citrate-vitamin D (CITRACAL+D) 315-200 mg-unit per tablet Take 1 tablet by mouth daily.     cholecalciferol, vitamin D3, 1,000 unit tablet Take 2,000 Units by mouth daily.      clopidogrel (PLAVIX) 75 mg tablet Take 1 tablet by mouth  daily     co-enzyme Q-10 30 mg capsule Take 30 mg by mouth 3 (three) times a day.     escitalopram oxalate (LEXAPRO) 5 MG tablet Take 20 mg by mouth.     lidocaine (XYLOCAINE) 5 % ointment Apply two times a day prn for pain to the affected area.     melatonin 10 mg TbER Take 10 mg by mouth at bedtime.     metoprolol succinate (TOPROL-XL) 50 MG 24 hr tablet Take 1 tablet (50 mg total) by mouth daily.     multivitamin with minerals (THERA-M) 9 mg iron-400 mcg Tab tablet Take 1 tablet by mouth daily.     omeprazole (PRILOSEC) 20 MG capsule Take 1  capsule by mouth  daily     simvastatin (ZOCOR) 40 MG tablet Take 40 mg by mouth bedtime.     Current Facility-Administered Medications on File Prior to Visit   Medication     denosumab 60 mg (PROLIA 60 mg/ml)     No Known Allergies  Social History     Tobacco Use     Smoking status: Former Smoker     Smokeless tobacco: Never Used   Substance Use Topics     Alcohol use: No     Drug use: No

## 2021-06-24 NOTE — PROGRESS NOTES
"Subjective:   Manjeet Oshea is a(n) 84 y.o. White or  male who presents to [facility], accompanied by a friend (Jaleesa), with the following complaint(s):  Back Injury / fell on the ground (5 days ago) and Back Pain    History of Present Illness:  Primary symptom: Back pain  Onset: 4 day(s) ago  Progression: Persisting  Location: Mid / upper back  Laterality: Central  Quality: \"a good pain\"  Pain rating (0-10): Unable to rate  Frequency: Comes / goes  Relieving factors: None identified  Exacerbating factors: None identified  Radicular pain: No  Lower extremity weakness: No  Lower extremity paresthesias: No  Saddle anesthesia: No  Bowel incontinence: No  Bladder incontinence: No  Additional symptoms: None  Known injury: Yes, see below.   History of similar pain: Has chronic back pain.   History of spine surgery: No  Home treatments utilized: Acetaminophen and topical lidocaine patch (prescribed for chronic back pain).   Tobacco user / exposure: Former smoker  Additional history: Patient is a difficult historian due to memory impairment. History is obtained primarily from the patient's friend, Jaleesa, and his daughter, Gracie, who was on speaker phone during the initial part of the visit. Patient resides in [facility] at [facility]. Family was not notified of any injury last week. Jaleesa learned about an injury yesterday but could not bring him in for evaluation until today due to needing permission from his daughter to do so. Staff informed her that he fell during an outing on 2/27/2019. Patient reports that he fell in [facility], but nursing assistants reported that he fell while he was getting off of the bus and struck his back on the bus steps. Patient denies head injury. Staff have not reported head injury. Normally ambulates with a cane. Is now having difficulty getting up out of a chair now due to worsening pain. Has history of compression fractures. Uses lidocaine patches on the sacral area " chronically for back pain.     The following portions of the patient's history were reviewed and updated as appropriate: allergies, current medications, past family history, past medical history, past social history, past surgical history and problem list.    Review of Systems:   Review of Systems   Unable to perform ROS: Dementia     Objective:     Vitals:    03/03/19 1452   BP: 106/86   Patient Site: Right Arm   Patient Position: Sitting   Cuff Size: Adult Regular   Pulse: (!) 59   Resp: 20   Temp: 97.6  F (36.4  C)   SpO2: 97%   Weight: 169 lb (76.7 kg)     Physical Exam   Constitutional: He appears well-developed and well-nourished. He is cooperative.  Non-toxic appearance. No distress.   Seated in a wheelchair.    HENT:   Head: Normocephalic and atraumatic.   Mouth/Throat: Mucous membranes are normal.   Cardiovascular: Normal rate, regular rhythm, S1 normal and S2 normal. Exam reveals no gallop and no friction rub.   No murmur heard.  Pulmonary/Chest: Effort normal and breath sounds normal. No stridor. He has no wheezes. He has no rhonchi. He has no rales.   Musculoskeletal:        Cervical back: He exhibits no bony tenderness, no laceration (or abrasion or ecchymosis) and no spasm.        Thoracic back: He exhibits no bony tenderness, no laceration (or abrasion or ecchymosis) and no spasm.        Lumbar back: He exhibits no bony tenderness, no laceration (or abrasion or ecchymosis) and no spasm.   Lidocaine patch in place over the sacral area.    Neurological: He is alert. No cranial nerve deficit.   Skin: Skin is warm and dry. No pallor.   Nursing note and vitals reviewed.    Laboratory:  N/A    Radiology:  . [facility]  EXAM: XR THORACIC SPINE 2 VWS  LOCATION: [facility]  DATE/TIME: 3/3/2019 4:03 PM  INDICATION: Fall, mid back pain, evaluate for new compression fractures  COMPARISON: Chest radiograph 8/10/2018.  FINDINGS:   The upper thoracic vertebral bodies near the cervicothoracic  junction are not well evaluated, particularly in the lateral projection due to superimposed structures.   Height loss at the presumed T12 level appears greater than on 9/26/2017, now measuring approximately 30%  There is a severe presumed approximate T8 compression fracture with approximately 90% height loss anteriorly. This is likely unchanged from 8/10/2018, though the vertebral body is not well delineated on that prior study.  The bones are diffusely osteopenic.  There is exaggeration of the normal thoracic kyphosis. There is moderate dextroconvex thoracic curvature.  There are multiple fractured sternotomy wires.  There are atelectatic changes in the lung bases.    [facility] RADIOLOGY  EXAM: XR LUMBAR SPINE 2 OR 3 VWS  LOCATION: [facility]  DATE/TIME: 3/3/2019 4:04 PM  INDICATION: Fall, mid back pain, evaluate for new compression fractures  COMPARISON: MRI 8/29/2017. Radiograph 9/26/2017.  FINDINGS:   Nomenclature presumes 5 lumbar type vertebral bodies.  L4 and L5 vertebral body heights are unremarkable.  At L3, mild inferior endplate height loss appears unchanged.  At L2, vertebral body height loss has progressed from approximately 50% to approximately 60-70% anteriorly.  At L1, anterior height loss has progressed from approximately 30% to approximately 50%.  The bones are diffusely osteopenic.   Mild L3-4, L2-3 and L1-2 retrolisthesis is noted. There is mild accentuation of kyphosis at the T12-L1 level related to L1 vertebral body height loss. There is mild dextroconvex lumbar curvature.  There is L4-5 and L5-S1 facet arthropathy.  The imaged portions of the sacrum and doretha appear grossly intact. The sacrum is partially obscured by stool and bowel gas. There is partially imaged bilateral femoral ORIF hardware.    Electrocardiogram:  N/A    Assessment/Plan   1. Midline thoracic back pain, unspecified chronicity  - XR Lumbar Spine 2 or 3 VWS; Future  - XR Thoracic Spine 2 VWS; Future    - X-rays  of the thoracic and lumber spine completed to evaluate for new compression fractures. X-rays show mild worsening of several old compression fractures but no new fractures. Recommended continued use of acetaminophen and lidocaine patch as needed for pain control. Recommended that lidocaine patch be placed over the more painful thoracic area than the sacral area. Unable to prescribe an NSAID due to antiplatelet therapy. Do not recommend opioid analgesia due to risk of side effects.   - Counseled patient, his daughter, and his friend regarding assessment and plan for evaluation and treatment. Questions were answered. See AVS for the specific written instructions that were provided at the conclusion of the visit.   - Discussed signs / symptoms that warrant urgent / emergent medical attention.   - Follow up with PCP in 1 week.     Twenty-five minutes total time spent with the patient, with over 50% of this time spent in counseling and / or coordination of care.     oWlfgang Salguero MD

## 2021-06-24 NOTE — PATIENT INSTRUCTIONS - HE
- X-rays of the thoracic and lumbar spine show mild worsening of several compression fractures but no new compression fractures. Copies of x-ray reports were provided today.   - Recommend that the lidocaine patch be applied to the mid-back rather than the lower back. This has been reflected on the medication list.   - See Dr. Martines for follow up within the next week to discuss whether or not any additional imaging should be completed or if any medication changes should be made.   - Return as needed in the meantime.

## 2021-06-24 NOTE — TELEPHONE ENCOUNTER
Anali Ramos for orders requested below?  Please advise.  Thank you.  Jayashree MOHAN, ASHLEE/CMT....................1:47 PM

## 2021-06-24 NOTE — TELEPHONE ENCOUNTER
Spoke with Bart at [facility] and relayed message below from Dr. Chung.  She verbalized understanding and had no further questions at this time.  Jayashree MOHAN, ASHLEE/SURENDRA....................2:36 PM

## 2021-06-25 NOTE — PROGRESS NOTES
Progress Notes by Vikas Mejia MD at 7/3/2017  1:20 PM     Author: Vikas Mejia MD Service: -- Author Type: Physician    Filed: 7/3/2017  1:56 PM Encounter Date: 7/3/2017 Status: Signed    : Vikas Mejia MD (Physician)        `        [email or web address]/Heart            Thank you Dr. Martines for asking the [facility] team to participate in the care of your patient, Manjeet Oshea.     Impression and Plan     1.  Coronary artery disease.  Manjeet has known coronary artery disease having had prior coronary artery bypass graft surgery.  Specifically, patient underwent coronary artery bypass graft surgery in 1983 at [facility].  He has had 2 stent procedures on the saphenous vein graft to the right coronary artery.  Patient last underwent angiography 30 April 2009 at which time he had successful bare-metal stenting of saphenous vein graft to distal right coronary artery.    This would appear to be stable.  Patient reports no chest pain symptoms.    2.  Hypertension.  Blood pressure is reasonable knee office today at 122/76 mmHg.    3.  Dyslipidemia.    Continue statin therapy.    4.  Recent transient ischemic attack (TIA). More recently, patient had suffered what was felt to be a transient ischemic attack.  He was hospitalized at [facility].  During that hospitalization, there was some discussion of perhaps possible rhythm disturbance such as atrial fibrillation which may have been a contributor and potential Holter monitor/event recorder was proposed.  In addition to aspirin, patient was started on clopidogrel.  I do feel that this would be reasonable.  Recommend:    30 day event recorder.           History of Present Illness    Once again I would like to thank you again for asking me to participate in the care of your patient, Manjeet Oshea.  As you know, but to reiterate for my own records, Manjeet Oshea is a 82 y.o. male with known  coronary artery disease having had prior coronary artery bypass graft surgery.  Specifically, patient underwent coronary artery bypass graft surgery in 1983 at [facility].  He has had 2 stent procedures on the saphenous vein graft to the right coronary artery.  Patient last underwent angiography 30 April 2009 at which time he had successful bare-metal stenting of saphenous vein graft to distal right coronary artery.    More recently, patient had suffered what was felt to be a transient ischemic attack.  He was hospitalized at [facility].  During that hospitalization, there was some discussion of perhaps possible rhythm disturbance such as atrial fibrillation which may have been a contributor and potential Holter monitor/event recorder was proposed.  In addition to aspirin, patient was started on clopidogrel.    On interview, Manjeet denies any concerning cardiovascular symptoms otherwise.  He specifically denies chest pain.  He reports no shortness of breath or subjective decline from baseline in his exercise tolerance other than that related to some orthopedic issues.  He reports no subjective palpitations.  He denies lightheadedness.    Further review of systems is otherwise negative/noncontributory (medical record and 13 point review of systems reviewed as well and pertinent positives noted).         Cardiac Diagnostics     Regadenoson nuclear perfusion study 26 December 2012:  1. No evidence of ischemia.  2. Small to medium sized moderate intensity fixed defect in the lateral wall consistent with infarction.  3. Normal left ventricular systolic performance with ejection fraction of 56% with lateral hypokinesis.    Coronary angiography 30 April 2009: Three-vessel coronary artery disease with moderate left main stenosis.  1. Previous stent to proximal right coronary artery with mild restenosis.  2. Chronic occlusion of ostial segment of saphenous vein graft to obtuse marginal coronary  "artery.  3. Patent saphenous vein graft to first diagonal and mid left anterior descending coronary artery.  4. Successful PCI with bare-metal stenting of saphenous vein graft to distal right coronary artery.  5. Left ventriculography revealed mild inferoapical hypokinesis and mild to moderate mitral insufficiency.    12-lead ECG (personally reviewed) 22 December 2015: Sinus rhythm with premature ventricular contractions.  Right bundle branch block.           Physical Examination       /76 (Patient Site: Left Arm, Patient Position: Sitting, Cuff Size: Adult Regular)  Pulse 90  Resp 16  Ht 5' 6\" (1.676 m)  Wt 162 lb (73.5 kg)  SpO2 97%  BMI 26.15 kg/m2        Wt Readings from Last 3 Encounters:   07/03/17 162 lb (73.5 kg)   06/29/17 160 lb (72.6 kg)   05/22/17 175 lb (79.4 kg)       The patient is alert and oriented times three. Sclerae are anicteric. Mucosal membranes are moist. Jugular venous pressure is normal. No significant adenopathy/thyromegally appreciated. Lungs are clear with good expansion. On cardiovascular exam, the patient has a regular S1 and S2. Abdomen is soft and non-tender. Extremities reveal no clubbing, cyanosis, or edema.       Imaging     Chest radiograph 29 March 2017:  1. Large hiatal hernia.   2. No acute findings.          Family History/Social History/Risk Factors   Patient does not smoke.  Family history of hypertension.  Patient is retired.  He had worked for the telephone company prior to detention.         Medications  Allergies   Current Outpatient Prescriptions   Medication Sig Dispense Refill   ? acetaminophen (TYLENOL) 325 MG tablet Take by mouth as needed for pain.     ? aspirin 81 MG EC tablet Take 81 mg by mouth daily.     ? calcium citrate-vitamin D (CITRACAL+D) 315-200 mg-unit per tablet Take 1 tablet by mouth daily.     ? cholecalciferol, vitamin D3, (VITAMIN D3) 2,000 unit cap Take 1 capsule by mouth daily.     ? clopidogrel (PLAVIX) 75 mg tablet Take 1 tablet " by mouth  daily 90 tablet 3   ? metoprolol (TOPROL-XL) 100 MG 24 hr tablet Take 100 mg by mouth daily.     ? MULTIVIT &MINERALS/FERROUS FUM (MULTI VITAMIN ORAL) Take 1 tablet by mouth.     ? omeprazole (PRILOSEC) 20 MG capsule Take 1 capsule by mouth  daily 90 capsule 3   ? simvastatin (ZOCOR) 40 MG tablet Take 40 mg by mouth bedtime.     ? traMADol (ULTRAM) 50 mg tablet Take 1 tablet (50 mg total) by mouth every 6 (six) hours as needed for pain. 30 tablet 0     No current facility-administered medications for this visit.       No Known Allergies       Lab Results   Lab Results   Component Value Date     08/02/2016    K 4.2 08/02/2016     08/02/2016    CO2 24 08/02/2016    BUN 19 08/02/2016    CREATININE 1.04 08/02/2016    CALCIUM 9.5 08/02/2016     Lab Results   Component Value Date    WBC 5.6 08/02/2016    HGB 14.6 08/02/2016    HCT 43.1 08/02/2016    MCV 91 08/02/2016     08/02/2016     Lab Results   Component Value Date    CHOL 150 08/14/2015    TRIG 144 08/14/2015    HDL 43 08/14/2015    LDLCALC 78 08/14/2015     Lab Results   Component Value Date    INR 1.00 11/30/2013     Lab Results   Component Value Date    CKMB 4 11/30/2013    CKMB 5 12/25/2012    TROPONINI 0.01 12/22/2015    TROPONINI 0.03 11/30/2013    TROPONINI 0.02 11/30/2013     Lab Results   Component Value Date    TSH 0.95 10/07/2011          Medical History  Surgical History   Past Medical History:   Diagnosis Date   ? Chronic back pain    ? Chronic sinusitis    ? Coronary artery disease    ? GERD (gastroesophageal reflux disease)    ? Hyperlipidemia    ? Osteoporosis         Status post coronary artery bypass graft surgery.

## 2021-07-03 NOTE — ADDENDUM NOTE
Addendum Note by Megan Mancera CMA at 8/14/2017 11:59 PM     Author: Megan Mancera CMA Service: -- Author Type: Certified Medical Assistant    Filed: 8/18/2017 11:56 AM Date of Service: 8/14/2017 11:59 PM Status: Signed    : Megan Mancera CMA (Certified Medical Assistant)    Encounter addended by: Megan Mancera CMA on: 8/18/2017 11:56 AM<BR>     Actions taken: Charge Capture section accepted

## 2021-07-03 NOTE — ADDENDUM NOTE
Addendum Note by Megan Mancera CMA at 9/28/2017 11:59 PM     Author: Megan Mancera CMA Service: -- Author Type: Certified Medical Assistant    Filed: 10/9/2017 11:02 AM Date of Service: 9/28/2017 11:59 PM Status: Signed    : Megan Mancera CMA (Certified Medical Assistant)    Encounter addended by: Megan Mancera CMA on: 10/9/2017 11:02 AM<BR>     Actions taken: Charge Capture section accepted

## 2021-07-03 NOTE — ADDENDUM NOTE
Addendum Note by Aguilar Oliveira CMA at 9/22/2017  2:51 PM     Author: Aguilar Oliveira CMA Service: -- Author Type: Certified Medical Assistant    Filed: 9/22/2017  2:51 PM Date of Service: 9/22/2017  2:51 PM Status: Signed    : Aguilar Oliveira CMA (Certified Medical Assistant)    Encounter addended by: Aguilar Oliveira CMA on: 9/22/2017  2:51 PM<BR>     Actions taken: Charge Capture section accepted

## 2021-07-03 NOTE — ADDENDUM NOTE
Addendum Note by Tanya Rosario RN at 1/4/2018 11:59 PM     Author: Tanya Rosario RN Service: -- Author Type: Registered Nurse    Filed: 1/10/2018  9:25 AM Date of Service: 1/4/2018 11:59 PM Status: Signed    : Tanya Rosario RN (Registered Nurse)    Encounter addended by: Tanya Rosario RN on: 1/10/2018  9:25 AM<BR>     Actions taken: Visit diagnoses modified, Charge Capture section accepted

## 2021-07-03 NOTE — ADDENDUM NOTE
Addendum Note by Michelle Davila LPN at 8/30/2019  2:55 PM     Author: Michelle Davila LPN Service: -- Author Type: Licensed Nurse    Filed: 8/30/2019  2:55 PM Encounter Date: 8/29/2019 Status: Signed    : Michelle Davila LPN (Licensed Nurse)    Addended by: MICHELLE DAVILA on: 8/30/2019 02:55 PM        Modules accepted: Orders

## 2021-07-03 NOTE — ADDENDUM NOTE
Addendum Note by Megan Mancera CMA at 9/28/2017 11:59 PM     Author: Megan Mancera CMA Service: -- Author Type: Certified Medical Assistant    Filed: 10/2/2017  1:53 PM Date of Service: 9/28/2017 11:59 PM Status: Signed    : Megan Mancera CMA (Certified Medical Assistant)    Encounter addended by: Megan Mancera CMA on: 10/2/2017  1:53 PM<BR>     Actions taken: Charge Capture section accepted

## 2021-07-03 NOTE — ADDENDUM NOTE
Addendum Note by Lou Rivas PA-C at 8/15/2017 11:59 PM     Author: Lou Rivas PA-C Service: -- Author Type: Physician Assistant    Filed: 9/1/2017  2:09 PM Date of Service: 8/15/2017 11:59 PM Status: Signed    : Lou Rivas PA-C (Physician Assistant)    Encounter addended by: Lou Rivas PA-C on: 9/1/2017  2:09 PM<BR>     Actions taken: Sign clinical note,  activity accessed, Diagnosis association updated

## 2021-08-03 PROBLEM — S22.000A COMPRESSION FRACTURE OF BODY OF THORACIC VERTEBRA (H): Status: RESOLVED | Noted: 2019-07-07 | Resolved: 2019-09-02

## 2021-09-11 ENCOUNTER — HEALTH MAINTENANCE LETTER (OUTPATIENT)
Age: 86
End: 2021-09-11

## 2022-06-18 ENCOUNTER — HEALTH MAINTENANCE LETTER (OUTPATIENT)
Age: 87
End: 2022-06-18

## 2022-10-30 ENCOUNTER — HEALTH MAINTENANCE LETTER (OUTPATIENT)
Age: 87
End: 2022-10-30

## 2023-06-25 ENCOUNTER — HEALTH MAINTENANCE LETTER (OUTPATIENT)
Age: 88
End: 2023-06-25

## 2109-08-16 ENCOUNTER — RECORDS - HEALTHEAST (OUTPATIENT)
Dept: ADMINISTRATIVE | Facility: OTHER | Age: OVER 89
End: 2109-08-16